# Patient Record
Sex: MALE | Race: WHITE | Employment: OTHER | URBAN - METROPOLITAN AREA
[De-identification: names, ages, dates, MRNs, and addresses within clinical notes are randomized per-mention and may not be internally consistent; named-entity substitution may affect disease eponyms.]

---

## 2021-11-17 ENCOUNTER — OFFICE VISIT (OUTPATIENT)
Dept: ORTHOPEDIC SURGERY | Age: 56
End: 2021-11-17
Payer: COMMERCIAL

## 2021-11-17 VITALS — HEIGHT: 72 IN | BODY MASS INDEX: 26.13 KG/M2

## 2021-11-17 DIAGNOSIS — M25.511 RIGHT SHOULDER PAIN, UNSPECIFIED CHRONICITY: Primary | ICD-10-CM

## 2021-11-17 DIAGNOSIS — S46.011A STRAIN OF RIGHT ROTATOR CUFF CAPSULE, INITIAL ENCOUNTER: ICD-10-CM

## 2021-11-17 PROCEDURE — 99203 OFFICE O/P NEW LOW 30 MIN: CPT | Performed by: ORTHOPAEDIC SURGERY

## 2021-11-17 RX ORDER — MELOXICAM 15 MG/1
15 TABLET ORAL DAILY PRN
Qty: 30 TABLET | Refills: 5 | Status: SHIPPED | OUTPATIENT
Start: 2021-11-17

## 2021-11-17 NOTE — PROGRESS NOTES
Chief Complaint    Shoulder Pain (NP RIGHT SHOULDER)      History of Present Illness:  Yancy Manzo is a pleasant, RHD 55 y.o. male here today for evaluation of his right shoulder. He is self-employed  as an . He first noticed his symptoms 4 days ago after he was doing a lot of heavy lifting and repetitive moving, although he does not report a discrete injury. He does complain of deltoid referred pain. His biggest complaint is pain rather than loss of motion. He does have significant pain at night as well. He has taken oral anti-inflammatories and topical Biofreeze which only temporarily improve his symptoms. Prior to this onset of pain he was having no problems with his shoulder. He was referred to us today by his daughter who is an  for [employer] [employer] [facility]. Medical History:    Review of Systems   Gastrointestinal:        Hemorrhoids   All other systems reviewed and are negative. Patient's medications, allergies, past medical, surgical, social and family histories were reviewed and updated as appropriate.     Past Medical History:   Diagnosis Date    Thyroid disease       Social History     Socioeconomic History    Marital status:      Spouse name: Not on file    Number of children: Not on file    Years of education: Not on file    Highest education level: Not on file   Occupational History    Not on file   Tobacco Use    Smoking status: Never Smoker    Smokeless tobacco: Not on file   Substance and Sexual Activity    Alcohol use: Yes     Comment: rare    Drug use: No    Sexual activity: Not on file   Other Topics Concern    Not on file   Social History Narrative    Not on file     Social Determinants of Health     Financial Resource Strain:     Difficulty of Paying Living Expenses: Not on file   Food Insecurity:     Worried About Running Out of Food in the Last Year: Not on file    Karri of Food in the Last Year: Not on file   Transportation Needs:     Lack of Transportation (Medical): Not on file    Lack of Transportation (Non-Medical): Not on file   Physical Activity:     Days of Exercise per Week: Not on file    Minutes of Exercise per Session: Not on file   Stress:     Feeling of Stress : Not on file   Social Connections:     Frequency of Communication with Friends and Family: Not on file    Frequency of Social Gatherings with Friends and Family: Not on file    Attends Taoist Services: Not on file    Active Member of [address] [place] [address] [employer] or Organizations: Not on file    Attends Club or Organization Meetings: Not on file    Marital Status: Not on file   Intimate Partner Violence:     Fear of Current or Ex-Partner: Not on file    Emotionally Abused: Not on file    Physically Abused: Not on file    Sexually Abused: Not on file   Housing Stability:     Unable to Pay for Housing in the Last Year: Not on file    Number of [place] in the Last Year: Not on file    Unstable Housing in the Last Year: Not on file       No Known Allergies  Current Outpatient Medications on File Prior to Visit   Medication Sig Dispense Refill    levothyroxine (SYNTHROID) 100 MCG tablet Take 100 mcg by mouth Daily      naproxen (NAPROSYN) 500 MG tablet Take 1 tablet by mouth 2 times daily. (Patient not taking: Reported on 11/17/2021) 20 tablet 0    methocarbamol (ROBAXIN-750) 750 MG tablet Take 1 tablet by mouth 4 times daily. Use as needed for muscle pain or soreness (Patient not taking: Reported on 11/17/2021) 20 tablet 0     No current facility-administered medications on file prior to visit. Review of Systems  A 14 point review of systems was completed by the patient on 11/17/2021 and is available in the media section of the scanned medical record and was reviewed on 11/17/2021. The review is negative with the exception of those things mentioned in the HPI and Past Medical History    Vital Signs: There were no vitals filed for this visit.     General Exam: Constitutional: Patient is adequately groomed with no evidence of malnutrition  Lymphatic: The lymphatic examination bilaterally reveals all areas to be without enlargement or induration. Vascular: Examination reveals no swelling or calf tenderness. Peripheral pulses are palpable and 2+. Neurological: The patient has good coordination. There is no weakness or sensory deficit. Right Shoulder Examination:    Inspection:  No skin lesions, no deformity, no swelling. Palpation:  Pain over the biceps, Mild pain over the rotator cuff, non-tender AC joint, no subacromial crepitus    Active Range of Motion: Forward Elevation 160, Abduction 170, External Rotation 60 bilaterally, Internal Rotation T10 bilaterally, Decreased extension at the cervical spine    Passive Range of Motion: No pain with cross arm adduction    Strength:  External Rotation 5/5, Internal Rotation 5/5, Champagne Toast 5/5, Supraspinatus 4+/5    Special Tests:  Negative belly press, Negative Elzbieta's, Negative Hawkin's, No Fito deformity. Neurovascular: Palpable radial pulse, normal sensation in the median, ulnar, radial nerve distributions    Additional Examinations:    Examination of the contralateral extremity does not show any tenderness, deformity or injury. Range of motion is unremarkable. There is no gross instability. There are no rashes, ulcerations or lesions. Strength and tone are normal.    Self assessment questionnaires were completed today. Radiology:     Plain radiographs of the right shoulder, comprising 3 views: AP, Scapular Y and Axillary lateral were  obtained and reviewed in the office:    Impression: Normal radiographic exam, no arthritic changes, no loss of joint space    Plain radiographs of the cervical spine, comprising 2 views: AP and lateral, were obtained and reviewed in the office today:     Impression: Mild arthritic changes.            Assessment :  Marty Chatterjee is a pleasant 55 y.o. male with pain related to possible rotator cuff vs biceps strain. Additionally we feel his cervical spine may be a contributing factor to his pain. Impression:  Encounter Diagnoses   Name Primary?  Right shoulder pain, unspecified chronicity Yes    Strain of right rotator cuff capsule, initial encounter        Office Procedures:  Orders Placed This Encounter   Procedures    XR SHOULDER RIGHT (MIN 2 VIEWS)     Standing Status:   Future     Number of Occurrences:   1     Standing Expiration Date:   11/17/2022     Order Specific Question:   Reason for exam:     Answer:   pain    XR CERVICAL SPINE (2-3 VIEWS)     Standing Status:   Future     Number of Occurrences:   1     Standing Expiration Date:   11/17/2022     Order Specific Question:   Reason for exam:     Answer:   pain   [address] [place] [address]     Referral Priority:   Routine     Referral Type:   Eval and Treat     Referral Reason:   Specialty Services Required     Requested Specialty:   Nutrition     Number of Visits Requested:   1       Treatment Plan:  Pertinent imaging was reviewed with Radha Alarcon. The etiology, natural history, and treatment options for the disorder were discussed. The roles of activity modifications, medications, cryotherapy and heat, injections, physical therapy, and surgical interventions were all described to the patient and questions were answered. We feel conservative treatment is most appropriate. We recommend an anti-inflammatory - we will call meloxicam in to his pharmacy. He may also benefit from topical Voltaren gel. We do recommend he meet with a physical therapist to obtain a long-term home based program. Detailed therapy orders were placed in his chart. He would like to do this at the [address] [place] [address]. He is agreeable to these modalities. Radha Alarcon will follow up in 4-6 weeks if his symptoms persist and/or as needed.  They were in agreement with this plan and all questions were answered to the patient's satisfaction. They were encouraged to call with any questions. 10:36 AM  11/17/2021    Jolanta Gaalrza ATC  [employer] [address]al[address]    During this examination, I, Foreign Abdi, functioned as a scribe for Dr. Melissa Toney. The history taking and physical examination were performed by Dr. Rm Swann. All counseling during the appointment was performed between the patient and Dr. Rm Swann. 11/17/21  _______________  I, Dr. Melissa Toney, personally performed the services described in this documentation as described by Jolanta Galarza ATC in my presence, and it is both accurate and complete. Samelázaro Swann MD, PhD  11/17/2021

## 2021-11-17 NOTE — LETTER
Shoulder Elbow Rehabilitation Referral    Patient Name: Anthony Willett      YOB: 1965    Diagnosis:   1. Right shoulder pain, unspecified chronicity    2. Strain of right rotator cuff capsule, initial encounter        Precautions:     Post Op Instructions:  [] Continuous passive motion (CPM)  [] Elbow range of motion  [x] Exercise in plane of scapula   []  Strengthening     [] Pulley and instruction    [x] Home exercise program (copy to patient)   [] Sling when arm at risk  [] Sling or brace at all times   [] AAROM: Forward elevation to             [] AAROM: External rotation to     [] Isometric external rotator strengthening [] AAROM: internal rotation: up the back  [] Isometric abductor strengthening  [] AAROM: Internal abduction     [] Isometric internal rotator strengthening [] AAROM: cross-body adduction             Stretching:     Strengthening:  [x] Four quadrant (FE, ER, IR, CBA)  [x] Rotator cuff (ER, IR, Abd)  [] Forward Elevation    [] External Rotators     [] External Rotation    [] Internal Rotators  [] Internal Rotation: up/back   [] Abductors     [] Internal Rotation: supine in abduction  [] Flexors  [] Cross-body abduction    [] Extensors  [] Pendulum (FE, Abd/Add, cw/ccw)  [x] Scapular Stabilizers   [] Wall-walking (FE, Abd)    [x] Shoulder shrugs     [] Table slides      [x] Rhomboid pinch  [] Elbow (flex, ext, pron, sup)    [] Lat.  Pull downs     [] Medial epicondylitis program    [] Forward punch   [] Lateral epicondylitis program    [] Internal rotators     [x] Progressive resistive exercises  [] Bench Press        [] Bench press plus  Activities:     [] Lateral pull-downs  [x] Rowing     [] Progressive two-hand supine press  [] Stepper/Exercise bike   [x] Biceps: curls/supination  [] Swimming  [] Water exercises    Modalities: PRN    Return to Sport:  [] Ultrasound     [] Plyometrics  [] Iontophoresis     [] Rhythmic stabilization  [] Moist heat     [] Core strengthening   [] Massage     [] Sports specific program:   [x] Cryotherapy      [] Electrical stimulation     [] Paraffin  [] Whirlpool  [] TENS    [x] Home exercise program (copy to patient). Perform exercises for:   15     minutes    2-3      times/day  [x] Supervised physical therapy  Frequency: []  1x week  [x] 2x week  [] 3x week  [] Other:   Duration: [] 2 weeks   [] 4 weeks  [x] 6 weeks  [] Other:     Additional Instructions:           Amado Esquivel MD, PhD

## 2021-11-17 NOTE — PROGRESS NOTES
Review of Systems   Gastrointestinal:        Hemorrhoids   All other systems reviewed and are negative.

## 2021-11-18 ENCOUNTER — TELEPHONE (OUTPATIENT)
Dept: ORTHOPEDIC SURGERY | Age: 56
End: 2021-11-18

## 2021-11-18 DIAGNOSIS — M25.511 RIGHT SHOULDER PAIN, UNSPECIFIED CHRONICITY: Primary | ICD-10-CM

## 2021-11-18 NOTE — TELEPHONE ENCOUNTER
General Question     Subject: NEEDS ORDER CORRECTED FOR PT AT [facility] FF/A NUTRITION ORDER WAS SENT   Patient and /or Facility Request: PATIENT   Contact Number: [phone]

## 2021-11-26 ENCOUNTER — HOSPITAL ENCOUNTER (OUTPATIENT)
Dept: PHYSICAL THERAPY | Age: 56
Setting detail: THERAPIES SERIES
Discharge: HOME | End: 2021-11-26
Payer: COMMERCIAL

## 2021-11-26 PROCEDURE — 97110 THERAPEUTIC EXERCISES: CPT

## 2021-11-26 PROCEDURE — 97161 PT EVAL LOW COMPLEX 20 MIN: CPT

## 2021-11-26 NOTE — PLAN OF CARE
Jose Roberto, [facility], [address]  Phone: [phone]   Fax: [phone]                                                   Physical Therapy Certification    Dear Referring Practitioner: Ashely Saldaña MD,    We had the pleasure of evaluating the following patient for physical therapy services at [address] [place] [address]. A summary of our findings can be found in the initial assessment below. This includes our plan of care. If you have any questions or concerns regarding these findings, please do not hesitate to contact me at the office phone number checked above. Thank you for the referral.       Physician Signature:_______________________________Date:__________________  By signing above (or electronic signature), therapists plan is approved by physician      Patient: Grace Bonner   : 1965   MRN: [id number]  Referring Physician: Referring Practitioner: Ashely Saldaña MD      Evaluation Date: 2021      Medical Diagnosis Information:  Diagnosis: B80.722 (ICD-10-CM) - Right shoulder pain, unspecified chronicity   Treatment Diagnosis: neck pain, referred shoulder pain, decreased shoulder ROM, impaired posture, decreased cervical ROM, decreased cervical joint mobility                                         Insurance information: PT Insurance Information: BCBS - DED MET - $30 [facility]    Precautions/ Contra-indications:   Latex Allergy:  [x]NO      []YES  Preferred Language for Healthcare:   [x]English       []Other:    C-SSRS Triggered by Intake questionnaire (Past 2 wk assessment ):   [x] No, Questionnaire did not trigger screening.   [] Yes, Patient intake triggered C-SSRS Screening     [] Completed, no further action required.    [] Completed, PCP notified via Epic    SUBJECTIVE: Patient stated complaint: neck stiffness and right arm pain started about 1.5 weeks ago; no specific PANFILO - insidious onset. X-rays negative in shoulder but show degenerative changes in cervical spine. He was given a prescription for 1x/day anti-inflammatory. He uses it in the middle of the day so he can sleep at night. When warmed up and moving, symptoms are less. When he is stationary, he notices pain increases. 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 He reports when resting and watching TV, or when sleeping - symptoms are worse. MD recommended PT services and follow up in 4-6 weeks; if improvement obtained, no follow up needed. The patient's goal is to abolish pain in right arm and to increase neck movement. Relevant Medical History: COVID-19 in Nov 2020  Functional Outcome: NDI: raw score = 6; dysfunction = 12%    Pain Scale: 2-6/10 - sharp pain  Easing factors: icing, propping arm  Provocative factors: sleeping position    Type: []Constant   [x]Intermittent  []Radiating []Localized []other:     Numbness/Tingling: denies    Occupation/School:  -     Living Status/Prior Level of Function: Prior to this injury / incident, pt was independent with ADLs and IADLs, and maintains this.     OBJECTIVE:   Hand dominance: right    Palpation: +1 TTP C4-5-6, no shoulder TTP    Functional Mobility/Transfers: WFL    Posture: poor; rounded shoulders bilaterally, forward/protracted cervical spine, increased T/S kyphosis in sitting  When patient sits in proper posture, symptoms reduce in neck/arm    Bandages/Dressings/Incisions: N/A     Dermatomes Normal Abnormal Comments   Top of head (C1) x     Posterior occipital region (C2) x     Side of neck (C3) x     Top of shoulder (C4) x     Lateral deltoid (C5) x     Tip of thumb (C6) x     Distal middle finger (C7) x     Distal fifth finger (C8) x     Medial forearm (T1) x         Reflexes Normal Abnormal Comments   C5-6 Biceps x     C5-6 Brachioradialis      C7-8 Triceps x Senas x     Clonus        Cervical:  Flexion = 48*  Extension = 38  Protraction (increases cervical pain, referred pain to UT)  Retraction (mod-severely limited, but no pain referral)     PROM AROM    L R L R   Cervical Rotation   45* 68*   Cervical Side-bend   30 32*   Shoulder Flexion    142 148   Shoulder Abduction    120 125   Shoulder External Rotation    82 78   Shoulder Internal Rotation    90 80   Elbow Flexion    WNL WNL   Elbow Extension    WNL WNL       Strength (0-5) Left Right    Shoulder Shrug (C4) 5 5   Shoulder Flex 5 5   Shoulder Abd (C5) 5 5   Shoulder ER 5 4+   Shoulder IR 5 5   Biceps (C6) 5 5   Triceps (C7) 5 5   Lats NT NT   Middle Trap NT NT   Rhomboids NT NT   Lower Trap      Pronation      Supination      Wrist Flex (C7)     Wrist Ext (C6)     Wrist Radial Deviation     Wrist Ulnar Deviation                  Joint mobility: cervical spine C6, 7, T1; hypomobile R GHJ   []Normal    [x]Hypo   []Hyper      Orthopaedic Special Tests Positive  Negative  NT Comments    Shoulder        Neer  x      Jacob-Kumar x      Empty Can  x     Drop Arm       Clatsop's  x     Speeds        Sulcus        Apprehension (Anterior / Posterior)       Load and Shift              Elbow        Cozen's       Varus Stress       Valgus Stress        Tinel's                 Spurlings (negative R/L)  Distraction (slight symptoms in neck)                               [x] Patient history, allergies, meds reviewed. Medical chart reviewed. See intake form. Review Of Systems (ROS):  [x]Performed Review of systems (Integumentary, CardioPulmonary, Neurological) by intake and observation. Intake form has been scanned into medical record. Patient has been instructed to contact their primary care physician regarding ROS issues if not already being addressed at this time.       Co-morbidities/Complexities (which will affect course of rehabilitation):   []None        []Hx of COVID   Arthritic conditions   []Rheumatoid arthritis (M05.9)  []Osteoarthritis (M19.91)  []Gout   Cardiovascular conditions   []Hypertension (I10)  []Hyperlipidemia (E78.5)  []Angina pectoris (I20)  []Atherosclerosis (I70)  []Pacemaker  []Hx of CABG/stent/  cardiac surgeries   Musculoskeletal conditions   []Disc pathology   []Congenital spine pathologies   []Osteoporosis (M81.8)  []Osteopenia (M85.8)  []Scoliosis       Endocrine conditions   []Hypothyroid (E03.9)  []Hyperthyroid Gastrointestinal conditions   []Constipation (R95.77)   Metabolic conditions   []Morbid obesity (E66.01)  []Diabetes type 1(E10.65) or 2 (E11.65)   []Neuropathy (G60.9)     Cardio/Pulmonary conditions   []Asthma (J45)  []Coughing   []COPD (J44.9)  []CHF  []A-fib   Psychological Disorders  []Anxiety (F41.9)  []Depression (F32.9)   []Other:   Developmental Disorders  []Autism (F84.0)  []CP (G80)  []Down Syndrome (Q90.9)  []Developmental delay     Neurological conditions  []Prior Stroke (I69.30)  []Parkinson's (G20)  []Encephalopathy (G93.40)  []MS (G35)  []Post-polio (G14)  []SCI  []TBI  []ALS Other conditions  []Fibromyalgia (M79.7)  []Vertigo  []Syncope  []Kidney Failure  []Cancer      []currently undergoing                treatment  []Pregnancy  []Incontinence   Prior surgeries  []involved limb  []previous spinal surgery  [] section birth  []hysterectomy  []bowel / bladder surgery  []other relevant surgeries   [x]Other: COVID-19 in 2020, thyroid disease              Barriers to/and or personal factors that will affect rehab potential:              []Age  []Sex    []Smoker              []Motivation/Lack of Motivation                        [x]Co-Morbidities              []Cognitive Function, education/learning barriers              []Environmental, home barriers              []profession/work barriers  []past PT/medical experience  []other:  Justification: no impact on potential     Falls Risk Assessment (30 days):   [x] Falls Risk assessed and no intervention of functional outcome. [x] EVAL (LOW) 81680 (typically 15 minutes face-to-face)  [] EVAL (MOD) 63763 (typically 30 minutes face-to-face)  [] EVAL (HIGH) 31415 (typically 45 minutes face-to-face)  [] RE-EVAL     PLAN:  Frequency/Duration:  2 days per week for 4 Weeks:  INTERVENTIONS:  [x] Therapeutic exercise including: strength training, ROM, for Upper extremity and core   [x]  NMR activation and proprioception for UE, scap and Core   [x] Manual therapy as indicated for shoulder, scapula and spine to include: Dry Needling/IASTM, STM, PROM, Gr I-IV mobilizations, manipulation. [x] Modalities as needed that may include: thermal agents, E-stim, Biofeedback, US, iontophoresis as indicated  [x] Patient education on joint protection, postural re-education, activity modification, progression of HEP. HEP instruction:   Access Code: [email or web address]  URL: [employer]/  Date: 11/26/2021  Prepared by: Dana Edge    Exercises  Seated Passive Cervical Retraction - 1-3 x daily - 5 x weekly - 1-3 sets - 5 reps - 2-3 hold  Seated Scapular Retraction - 1-3 x daily - 5 x weekly - 13 sets - 5 reps - 3 hold  Seated Upper Trapezius Stretch - 1-3 x daily - 5 x weekly - 1 sets - 2-3 reps - 10 hold  Doorway Pec Stretch at 90 Degrees Abduction - 1-3 x daily - 5 x weekly - 1 sets - 4 reps - 10 hold  Standing Row with Anchored Resistance - 1 x daily - 4 x weekly - 3 sets - 10 reps  Shoulder Extension with Resistance - 1 x daily - 4 x weekly - 3 sets - 10 reps    GOALS:  Patient stated goal: to abolish R arm pain; increase neck movement/mobility  [] Progressing: [] Met: [] Not Met: [] Adjusted    Therapist goals for Patient:   Short Term Goals: To be achieved in: 2 weeks  1. Independent in HEP and progression per patient tolerance, in order to prevent re-injury. [] Progressing: [] Met: [] Not Met: [] Adjusted  2.  Patient will have a decrease in pain to <2/10 on average to facilitate improvement in movement, function, and ADLs as indicated by Functional Deficits. [] Progressing: [] Met: [] Not Met: [] Adjusted    Long Term Goals: To be achieved in: 4 weeks  1. Disability index score of 6% or less for the NDI to assist with reaching prior level of function. [] Progressing: [] Met: [] Not Met: [] Adjusted  2. Patient will demonstrate increased cervical extension AROM to 42, >45deg cervical flexion without pain and cervical rotation of >45deg R/L to allow for proper joint functioning as indicated by patients Functional Deficits. [] Progressing: [] Met: [] Not Met: [] Adjusted  3. Patient will demonstrate an increase in Strength to 5/5 to allow for proper functional mobility as indicated by patients Functional Deficits. [] Progressing: [] Met: [] Not Met: [] Adjusted  4. Patient will return to functional activities including computer work >30 min without increased symptoms or restriction. [] Progressing: [] Met: [] Not Met: [] Adjusted  5. Patient will have a decrease in pain to 0/10 on average to facilitate improvement in movement, function, and ADLs as indicated by Functional Deficits.   [] Progressing: [] Met: [] Not Met: [] Adjusted    Electronically signed by:  Shant Dueñas, PT, DPT, OMT-C

## 2021-12-08 ENCOUNTER — HOSPITAL ENCOUNTER (OUTPATIENT)
Dept: PHYSICAL THERAPY | Age: 56
Setting detail: THERAPIES SERIES
Discharge: HOME | End: 2021-12-08
Payer: COMMERCIAL

## 2021-12-08 PROCEDURE — 97140 MANUAL THERAPY 1/> REGIONS: CPT

## 2021-12-08 PROCEDURE — 97112 NEUROMUSCULAR REEDUCATION: CPT

## 2021-12-08 PROCEDURE — 97110 THERAPEUTIC EXERCISES: CPT

## 2021-12-08 NOTE — FLOWSHEET NOTE
[facility]  Outpatient Physical Therapy  Phone: [phone]   Fax: [phone]    Physical Therapy Daily Treatment Note  Date:  2021    Patient Name:  Anastacia Hurst    :  1965  MRN: [id number]  Medical/Treatment Diagnosis Information:  · Diagnosis: M25.511 (ICD-10-CM) - Right shoulder pain, unspecified chronicity  · Treatment Diagnosis: neck pain, referred shoulder pain, decreased shoulder ROM, impaired posture, decreased cervical ROM, decreased cervical joint mobility  Insurance/Certification information:  PT Insurance Information: [facility] MET - $30 [facility]  Physician Information:  Referring Practitioner: Staci Wilburn MD  Plan of care signed (Y/N): []  Yes [x]  No     Date of Patient follow up with Physician:      Progress Report: [x]  Yes  []  No     Date Range for reporting period:  Beginnin21  Ending: TBD    Progress report due (10 Rx/or 30 days whichever is less): visit #8 or     Recertification due (POC duration/ or 90 days whichever is less): visit #8 or     Visit # Insurance Allowable Auth required? Date Range   2 61 PCY [x]  Yes?  []  No      Units approved Units used Date Range   TBD TBD TBD     Latex Allergy:  [x]NO      []YES  Preferred Language for Healthcare:   [x]English       []other:    Functional Scale:       Date assessed:  NDI: raw score = 6; dysfunction = 12%  21    Pain level:  2/10     SUBJECTIVE: The patient reports improvement in shoulder pain and mobility after doing HEP. He has been compliant with HEP.     OBJECTIVE:  - 40deg flexion, 35deg ext, 25deg SB L, 26deg SB R //manual// 32deg R SB, 28deg L SB, 40deg flex, 38deg ext    RESTRICTIONS/PRECAUTIONS:     Exercises/Interventions:     Therapeutic Exercises (07021) Resistance / level Sets/sec Reps Notes          Cervical retraction     scap retractions     R UT stretch     TB rows lime 1 10  - ^ to CC   TB extensions lime 1 10 12/8 - ^ to [address] [facility] Doorframe pec stretch: Wide  High    10''  10''   2  2 12/8 - modified to patient's preference   Bilateral UE ER TB  2 15 12/8   TB Low T's lime 2 10 12/8   CC high rows       Cervical SNAGs       TB [place] Press orange 1 10 12/8   Levator Scapula Stretch  10'' 2, R 12/8           Therapeutic Activities (55825)                                          Neuromuscular Re-ed (74276)       Cervical tucks  5'' 5 12/8   Cervical tuck & lifts  5'' 8 12/8   Postural endurance @ wall       Wall W's  2 5 12/8                 Manual Intervention (26670) - 25'       Consider prone C/S PA mobs G3-4 5 mobs 3 passes, C2-7    Prone T/S mobs G3-4 5 mobs 3 passes T1-9    Consider CT junction mobs/manip G3-5 8 mobs, 1 manip R/L C6-T2 targeted    R Shoulder PROM  4' total     R shoulder Post/inf GHJ mobs G3-4 10x ea              Modalities:     Pt. Education:  -patient educated on diagnosis, prognosis and expectations for rehab  -all patient questions were answered    Home Exercise Program:  Access Code: [email or web address]  Blue band provided  URL: [email or web address]  Date: 12/08/2021  Prepared by: Haylie Miller    Exercises  Seated Passive Cervical Retraction - 1-3 x daily - 5 x weekly - 1-3 sets - 5 reps - 2-3 hold  Seated Scapular Retraction - 1-3 x daily - 5 x weekly - 13 sets - 5 reps - 3 hold  Seated Upper Trapezius Stretch - 1-3 x daily - 5 x weekly - 1 sets - 2-3 reps - 10 hold  Doorway Pec Stretch at 90 Degrees Abduction - 1-3 x daily - 5 x weekly - 1 sets - 4 reps - 10 hold  Standing Row with Anchored Resistance - 1 x daily - 4 x weekly - 3 sets - 10 reps  Shoulder Extension with Resistance - 1 x daily - 4 x weekly - 3 sets - 10 reps  Shoulder External Rotation and Scapular Retraction with Resistance - 1 x daily - 4 x weekly - 3 sets - 15 reps  Seated Levator Scapulae Stretch - 1-3 x daily - 5 x weekly - 1 sets - 2-3 reps - 10 hold    Access Code: [email or web address]  URL: [email or web address]  Date: 11/26/2021  Prepared by: Ethel Jeffries Kb     Exercises  Seated Passive Cervical Retraction - 1-3 x daily - 5 x weekly - 1-3 sets - 5 reps - 2-3 hold  Seated Scapular Retraction - 1-3 x daily - 5 x weekly - 13 sets - 5 reps - 3 hold  Seated Upper Trapezius Stretch - 1-3 x daily - 5 x weekly - 1 sets - 2-3 reps - 10 hold  Doorway Pec Stretch at 90 Degrees Abduction - 1-3 x daily - 5 x weekly - 1 sets - 4 reps - 10 hold  Standing Row with Anchored Resistance - 1 x daily - 4 x weekly - 3 sets - 10 reps  Shoulder Extension with Resistance - 1 x daily - 4 x weekly - 3 sets - 10 reps    Therapeutic Exercise and NMR EXR  [x] (47349) Provided verbal/tactile cueing for activities related to strengthening, flexibility, endurance, ROM for improvements in  [] LE / Lumbar: LE, proximal hip, and core control with self care, mobility, lifting, ambulation. [x] UE / Cervical: cervical, postural, scapular, scapulothoracic and UE control with self care, reaching, carrying, lifting, house/yardwork, driving, computer work. [x] (08110) Provided verbal/tactile cueing for activities related to improving balance, coordination, kinesthetic sense, posture, motor skill, proprioception to assist with   [] LE / lumbar: LE, proximal hip, and core control in self care, mobility, lifting, ambulation and eccentric single leg control. [x] UE / cervical: cervical, scapular, scapulothoracic and UE control with self care, reaching, carrying, lifting, house/yardwork, driving, computer work.   [] (71982) Therapist is in constant attendance of 2 or more patients providing skilled therapy interventions, but not providing any significant amount of measurable one-on-one time to either patient, for improvements in  [] LE / lumbar: LE, proximal hip, and core control in self care, mobility, lifting, ambulation and eccentric single leg control.    [] UE / cervical: cervical, scapular, scapulothoracic and UE control with self care, reaching, carrying, lifting, house/yardwork, driving, computer work.     NMR and Therapeutic Activities:    [x] (73439 or 34148) Provided verbal/tactile cueing for activities related to improving balance, coordination, kinesthetic sense, posture, motor skill, proprioception and motor activation to allow for proper function of   [] LE: / Lumbar core, proximal hip and LE with self care and ADLs  [x] UE / Cervical: cervical, postural, scapular, scapulothoracic and UE control with self care, carrying, lifting, driving, computer work.   [] (24872) Gait Re-education- Provided training and instruction to the patient for proper LE, core and proximal hip recruitment and positioning and eccentric body weight control with ambulation re-education including up and down stairs     Home Management Training / Self Care:  [] (86039) Provided self-care/home management training related to activities of daily living and compensatory training, and/or use of adaptive equipment for improvement with: ADLs and compensatory training, meal preparation, safety procedures and instruction in use of adaptive equipment, including bathing, grooming, dressing, personal hygiene, basic household cleaning and chores.      Home Exercise Program:    [x] (78285) Reviewed/Progressed HEP activities related to strengthening, flexibility, endurance, ROM of   [] LE / Lumbar: core, proximal hip and LE for functional self-care, mobility, lifting and ambulation/stair navigation   [x] UE / Cervical: cervical, postural, scapular, scapulothoracic and UE control with self care, reaching, carrying, lifting, house/yardwork, driving, computer work  [] (62795)Reviewed/Progressed HEP activities related to improving balance, coordination, kinesthetic sense, posture, motor skill, proprioception of   [] LE: core, proximal hip and LE for self care, mobility, lifting, and ambulation/stair navigation    [] UE / Cervical: cervical, postural,  scapular, scapulothoracic and UE control with self care, reaching, carrying, lifting, movement, function, and ADLs as indicated by Functional Deficits. []? Progressing: []? Met: []? Not Met: []? Adjusted     Long Term Goals: To be achieved in: 4 weeks  1. Disability index score of 6% or less for the NDI to assist with reaching prior level of function. []? Progressing: []? Met: []? Not Met: []? Adjusted  2. Patient will demonstrate increased cervical extension AROM to 42, >45deg cervical flexion without pain and cervical rotation of >45deg R/L to allow for proper joint functioning as indicated by patients Functional Deficits. []? Progressing: []? Met: []? Not Met: []? Adjusted  3. Patient will demonstrate an increase in Strength to 5/5 to allow for proper functional mobility as indicated by patients Functional Deficits. []? Progressing: []? Met: []? Not Met: []? Adjusted  4. Patient will return to functional activities including computer work >30 min without increased symptoms or restriction. []? Progressing: []? Met: []? Not Met: []? Adjusted  5. Patient will have a decrease in pain to 0/10 on average to facilitate improvement in movement, function, and ADLs as indicated by Functional Deficits. []? Progressing: []? Met: []? Not Met: []? Adjusted    Overall Progression Towards Functional goals/ Treatment Progress Update:  [x] Patient is progressing as expected towards functional goals listed. [] Progression is slowed due to complexities/Impairments listed. [] Progression has been slowed due to co-morbidities.   [] Plan just implemented, too soon to assess goals progression <30days   [] Goals require adjustment due to lack of progress  [] Patient is not progressing as expected and requires additional follow up with physician  [] Other    Persisting Functional Limitations/Impairments:  [x]Sleeping []Sitting               []Standing []Transfers        []Walking []Kneeling               []Stairs []Squatting / bending   []ADLs []Reaching  []Lifting  []Housework  []Driving [x]Job related tasks  [x]Sports/Recreation []Other:      ASSESSMENT:  Good improvement in cervical sidebend after manual this date. The patient was able to perform resisted scapular and RTC strengthening without increased symptoms and while maintaining good form. The patient required cues for form with wall W's and cervical tuck & lifts but performed adequately after. Good progress for 1st follow up visit. Treatment/Activity Tolerance:  [x] Patient able to complete tx  [] Patient limited by fatigue  [] Patient limited by pain  [] Patient limited by other medical complications  [] Other:     Prognosis: [x] Good [] Fair  [] Poor    Patient Requires Follow-up: [x] Yes  [] No    Plan for next treatment session: SEE FLOW ABOVE    PLAN: See eval. PT 2x / week for 4 weeks. [] Continue per plan of care [] Alter current plan (see comments)  [x] Plan of care initiated [] Hold pending MD visit [] Discharge    Electronically signed by: Nisha Dickens, PT PT, DPT, OMT-C    Note: If patient does not return for scheduled/ recommended follow up visits, this note will serve as a discharge from care along with most recent update on progress.

## 2021-12-10 ENCOUNTER — HOSPITAL ENCOUNTER (OUTPATIENT)
Dept: PHYSICAL THERAPY | Age: 56
Setting detail: THERAPIES SERIES
Discharge: HOME | End: 2021-12-10
Payer: COMMERCIAL

## 2021-12-10 PROCEDURE — 97140 MANUAL THERAPY 1/> REGIONS: CPT | Performed by: SPECIALIST/TECHNOLOGIST

## 2021-12-10 PROCEDURE — 97112 NEUROMUSCULAR REEDUCATION: CPT | Performed by: SPECIALIST/TECHNOLOGIST

## 2021-12-10 PROCEDURE — 97110 THERAPEUTIC EXERCISES: CPT | Performed by: SPECIALIST/TECHNOLOGIST

## 2021-12-10 NOTE — FLOWSHEET NOTE
[facility] Physical Therapy  Phone: [phone]   Fax: [phone]    Physical Therapy Daily Treatment Note  Date:  12/10/2021    Patient Name:  Jan Bliss    :  1965  MRN: [id number]  Medical/Treatment Diagnosis Information:  · Diagnosis: M25.511 (ICD-10-CM) - Right shoulder pain, unspecified chronicity  · Treatment Diagnosis: neck pain, referred shoulder pain, decreased shoulder ROM, impaired posture, decreased cervical ROM, decreased cervical joint mobility  Insurance/Certification information:  PT Insurance Information: [address] [facility] MET - $30 [facility]  Physician Information:  Referring Practitioner: Gina Wilde MD  Plan of care signed (Y/N): []  Yes [x]  No     Date of Patient follow up with Physician: Gomez Portillo     Progress Report: [x]  Yes  []  No     Date Range for reporting period:  Beginnin21  Ending: TBD    Progress report due (10 Rx/or 30 days whichever is less): visit #8 or     Recertification due (POC duration/ or 90 days whichever is less): visit #8 or     Visit # Insurance Allowable Auth required? Date Range   3 61 PCY [x]  Yes?  []  No      Units approved Units used Date Range   TBD TBD TBD     Latex Allergy:  [x]NO      []YES  Preferred Language for Healthcare:   [x]English       []other:    Functional Scale:       Date assessed:  NDI: raw score = 6; dysfunction = 12%  21    Pain level:  1/10 more stiffness than pain. SUBJECTIVE:  Stiffness is biggest issue more sore than pain. Done taking meloxicamm b/c pain is much less but still with stiffness. Hasan  PRN  Doing HEP as directed.    OBJECTIVE:  - 40deg flexion, 35deg ext, 25deg SB L, 26deg SB R //manual// 32deg R SB, 28deg L SB, 40deg flex, 38deg ext    RESTRICTIONS/PRECAUTIONS:     Exercises/Interventions: 45  Therapeutic Exercises (85403) 25' Resistance / level Sets/sec Reps Notes   Standing Rows  EOB 5# 2 10x 12/10   Cervical retraction scap retractions     R UT stretch HEP    TB rows blue 1 15x 12/10 - ^ to CC   TB extensions blue 1 15x 12/10 - ^ to CC   Doorframe pec stretch: Wide  High    10''  10''   2  2 12/8 - modified to patient's preference   Bilateral UE ER TB LIME 2 15 12/8   TB Low T's lime 2 10 12/8   SL ER / SL punch 2# 2 10x 12/10   CC high rows Blue/ 35# 2 10x 12/10   Cervical SNAGs       TB [place] Press orange 1 10 12/8   Levator Scapula Stretch HEP 10'' 2, R 12/8           Therapeutic Activities (76068)                                          Neuromuscular Re-ed (32790)       Cervical tucks  5'' 5 12/8   Cervical tuck & lifts  5'' 8 12/8   Postural endurance @ wall       Wall W's  2 5 12/8                 Manual Intervention (50424) - 15'                R Shoulder PROM  4' total     R shoulder Post/inf GHJ mobs G3-4 10x ea     Cervical manual stretching into flexion/ STM to bilateral traps/ SO release/grade 1-2 gentle mobs cervical spine 10'        Modalities:     Pt. Education:  -patient educated on diagnosis, prognosis and expectations for rehab  -all patient questions were answered    Home Exercise Program:  Access Code: [email or web address]  Blue band provided  URL: [email or web address]  Date: 12/08/2021  Prepared by: Traci Dow    Exercises  Seated Passive Cervical Retraction - 1-3 x daily - 5 x weekly - 1-3 sets - 5 reps - 2-3 hold  Seated Scapular Retraction - 1-3 x daily - 5 x weekly - 13 sets - 5 reps - 3 hold  Seated Upper Trapezius Stretch - 1-3 x daily - 5 x weekly - 1 sets - 2-3 reps - 10 hold  Doorway Pec Stretch at 90 Degrees Abduction - 1-3 x daily - 5 x weekly - 1 sets - 4 reps - 10 hold  Standing Row with Anchored Resistance - 1 x daily - 4 x weekly - 3 sets - 10 reps  Shoulder Extension with Resistance - 1 x daily - 4 x weekly - 3 sets - 10 reps  Shoulder External Rotation and Scapular Retraction with Resistance - 1 x daily - 4 x weekly - 3 sets - 15 reps  Seated Levator Scapulae Stretch - 1-3 x daily - 5 x weekly - 1 sets - 2-3 reps - 10 hold    Access Code: [email or web address]  URL: [email or web address]  Date: 11/26/2021  Prepared by: Tori Calle     Exercises  Seated Passive Cervical Retraction - 1-3 x daily - 5 x weekly - 1-3 sets - 5 reps - 2-3 hold  Seated Scapular Retraction - 1-3 x daily - 5 x weekly - 13 sets - 5 reps - 3 hold  Seated Upper Trapezius Stretch - 1-3 x daily - 5 x weekly - 1 sets - 2-3 reps - 10 hold  Doorway Pec Stretch at 90 Degrees Abduction - 1-3 x daily - 5 x weekly - 1 sets - 4 reps - 10 hold  Standing Row with Anchored Resistance - 1 x daily - 4 x weekly - 3 sets - 10 reps  Shoulder Extension with Resistance - 1 x daily - 4 x weekly - 3 sets - 10 reps    Therapeutic Exercise and NMR EXR  [x] (39466) Provided verbal/tactile cueing for activities related to strengthening, flexibility, endurance, ROM for improvements in  [] LE / Lumbar: LE, proximal hip, and core control with self care, mobility, lifting, ambulation. [x] UE / Cervical: cervical, postural, scapular, scapulothoracic and UE control with self care, reaching, carrying, lifting, house/yardwork, driving, computer work. [x] (15366) Provided verbal/tactile cueing for activities related to improving balance, coordination, kinesthetic sense, posture, motor skill, proprioception to assist with   [] LE / lumbar: LE, proximal hip, and core control in self care, mobility, lifting, ambulation and eccentric single leg control. [x] UE / cervical: cervical, scapular, scapulothoracic and UE control with self care, reaching, carrying, lifting, house/yardwork, driving, computer work.   [] (35599) Therapist is in constant attendance of 2 or more patients providing skilled therapy interventions, but not providing any significant amount of measurable one-on-one time to either patient, for improvements in  [] LE / lumbar: LE, proximal hip, and core control in self care, mobility, lifting, ambulation and eccentric single leg control.    [] UE / cervical: cervical, scapular, scapulothoracic and UE control with self care, reaching, carrying, lifting, house/yardwork, driving, computer work. NMR and Therapeutic Activities:    [x] (22089 or 63307) Provided verbal/tactile cueing for activities related to improving balance, coordination, kinesthetic sense, posture, motor skill, proprioception and motor activation to allow for proper function of   [] LE: / Lumbar core, proximal hip and LE with self care and ADLs  [x] UE / Cervical: cervical, postural, scapular, scapulothoracic and UE control with self care, carrying, lifting, driving, computer work.   [] (58838) Gait Re-education- Provided training and instruction to the patient for proper LE, core and proximal hip recruitment and positioning and eccentric body weight control with ambulation re-education including up and down stairs     Home Management Training / Self Care:  [] (69732) Provided self-care/home management training related to activities of daily living and compensatory training, and/or use of adaptive equipment for improvement with: ADLs and compensatory training, meal preparation, safety procedures and instruction in use of adaptive equipment, including bathing, grooming, dressing, personal hygiene, basic household cleaning and chores.      Home Exercise Program:    [x] (56140) Reviewed/Progressed HEP activities related to strengthening, flexibility, endurance, ROM of   [] LE / Lumbar: core, proximal hip and LE for functional self-care, mobility, lifting and ambulation/stair navigation   [x] UE / Cervical: cervical, postural, scapular, scapulothoracic and UE control with self care, reaching, carrying, lifting, house/yardwork, driving, computer work  [] (66394)Reviewed/Progressed HEP activities related to improving balance, coordination, kinesthetic sense, posture, motor skill, proprioception of   [] LE: core, proximal hip and LE for self care, mobility, lifting, and ambulation/stair navigation    [] UE / Cervical: cervical, postural,  scapular, scapulothoracic and UE control with self care, reaching, carrying, lifting, house/yardwork, driving, computer work    Manual Treatments:  PROM / STM / Oscillations-Mobs:  G-I, II, III, IV (PA's, Inf., Post.)  [x] (97837) Provided manual therapy to mobilize LE, proximal hip and/or LS spine soft tissue/joints for the purpose of modulating pain, promoting relaxation,  increasing ROM, reducing/eliminating soft tissue swelling/inflammation/restriction, improving soft tissue extensibility and allowing for proper ROM for normal function with   [] LE / lumbar: self care, mobility, lifting and ambulation. [x] UE / Cervical: self care, reaching, carrying, lifting, house/yardwork, driving, computer work. Modalities:  [] (33338) Vasopneumatic compression: Utilized vasopneumatic compression to decrease edema / swelling for the purpose of improving mobility and quad tone / recruitment which will allow for increased overall function including but not limited to self-care, transfers, ambulation, and ascending / descending stairs. Charges:  Timed Code Treatment Minutes: 45   Total Treatment Minutes: 45     [] EVAL - LOW (28745)   [] EVAL - MOD (30745)  [] EVAL - HIGH (21275)  [] RE-EVAL (12242)  [x] ME(36604) x 1      [] Ionto  [x] NMR (35884) x 1       [] Vaso  [x] Manual (11051) x 1       [] Ultrasound  [] TA x        [] Mech Traction (00368)  [] Aquatic Therapy x      [] ES (un) (77573):   [] Home Management Training x  [] ES(attended) (87405)   [] Dry Needling 1-2 muscles (79621):  [] Dry Needling 3+ muscles (326602)  [] Group:      [] Other:     GOALS:  Patient stated goal: to abolish R arm pain; increase neck movement/mobility  []? Progressing: []? Met: []? Not Met: []? Adjusted     Therapist goals for Patient:   Short Term Goals: To be achieved in: 2 weeks  1. Independent in HEP and progression per patient tolerance, in order to prevent re-injury. []? Progressing: []?  Met: []? Not Met: []? Adjusted  2. Patient will have a decrease in pain to <2/10 on average to facilitate improvement in movement, function, and ADLs as indicated by Functional Deficits. []? Progressing: []? Met: []? Not Met: []? Adjusted     Long Term Goals: To be achieved in: 4 weeks  1. Disability index score of 6% or less for the NDI to assist with reaching prior level of function. []? Progressing: []? Met: []? Not Met: []? Adjusted  2. Patient will demonstrate increased cervical extension AROM to 42, >45deg cervical flexion without pain and cervical rotation of >45deg R/L to allow for proper joint functioning as indicated by patients Functional Deficits. []? Progressing: []? Met: []? Not Met: []? Adjusted  3. Patient will demonstrate an increase in Strength to 5/5 to allow for proper functional mobility as indicated by patients Functional Deficits. []? Progressing: []? Met: []? Not Met: []? Adjusted  4. Patient will return to functional activities including computer work >30 min without increased symptoms or restriction. []? Progressing: []? Met: []? Not Met: []? Adjusted  5. Patient will have a decrease in pain to 0/10 on average to facilitate improvement in movement, function, and ADLs as indicated by Functional Deficits. []? Progressing: []? Met: []? Not Met: []? Adjusted    Overall Progression Towards Functional goals/ Treatment Progress Update:  [x] Patient is progressing as expected towards functional goals listed. [] Progression is slowed due to complexities/Impairments listed. [] Progression has been slowed due to co-morbidities.   [] Plan just implemented, too soon to assess goals progression <30days   [] Goals require adjustment due to lack of progress  [] Patient is not progressing as expected and requires additional follow up with physician  [] Other    Persisting Functional Limitations/Impairments:  [x]Sleeping []Sitting               []Standing []Transfers        []Walking []Kneeling               []Stairs []Squatting / bending   []ADLs []Reaching  []Lifting  []Housework  []Driving [x]Job related tasks  [x]Sports/Recreation []Other:      ASSESSMENT:  Pt had some increased stiffness with lateral flexion but with SO release, STM to traps and gentle mobs this improved. The patient was able to perform resisted scapular and RTC strengthening without increased symptoms and while maintaining good form. Pt reported increased focus on ER and RTC strength/ stability and protecting upper traps from assist. Pt tolerated progressions with high rows and posterior shoulder scapular stabilization with program today, reported felt it working appropriate muscle groups    Treatment/Activity Tolerance:  [x] Patient able to complete tx  [] Patient limited by fatigue  [] Patient limited by pain  [] Patient limited by other medical complications  [] Other:     Prognosis: [x] Good [] Fair  [] Poor    Patient Requires Follow-up: [x] Yes  [] No    Plan for next treatment session: SEE FLOW ABOVE    PLAN: PT 2x / week for 4 weeks. [x] Continue per plan of care [] Alter current plan (see comments)  [] Plan of care initiated [] Hold pending MD visit [] Discharge    Electronically signed by: Laura Quintana PTA, [id number]    Note: If patient does not return for scheduled/ recommended follow up visits, this note will serve as a discharge from care along with most recent update on progress.

## 2021-12-15 ENCOUNTER — HOSPITAL ENCOUNTER (OUTPATIENT)
Dept: PHYSICAL THERAPY | Age: 56
Setting detail: THERAPIES SERIES
Discharge: HOME | End: 2021-12-15
Payer: COMMERCIAL

## 2021-12-15 PROCEDURE — 97140 MANUAL THERAPY 1/> REGIONS: CPT

## 2021-12-15 PROCEDURE — 97110 THERAPEUTIC EXERCISES: CPT

## 2021-12-15 PROCEDURE — 97530 THERAPEUTIC ACTIVITIES: CPT

## 2021-12-15 NOTE — FLOWSHEET NOTE
[facility]  Outpatient Physical Therapy  Phone: [phone]   Fax: [phone]    Physical Therapy Daily Treatment Note  Date:  12/15/2021    Patient Name:  Delon London    :  1965  MRN: [id number]  Medical/Treatment Diagnosis Information:  · Diagnosis: M25.511 (ICD-10-CM) - Right shoulder pain, unspecified chronicity  · Treatment Diagnosis: neck pain, referred shoulder pain, decreased shoulder ROM, impaired posture, decreased cervical ROM, decreased cervical joint mobility  Insurance/Certification information:  PT Insurance Information: [facility] MET - $30 [facility]  Physician Information:  Referring Practitioner: Beryle Hau, MD   Plan of care signed (Y/N): [x]  Yes []  No     Date of Patient follow up with Physician: Lucila Moreno     Progress Report: []  Yes  [x]  No     Date Range for reporting period:  Beginnin21  Ending: TBD    Progress report due (10 Rx/or 30 days whichever is less): visit #8 or     Recertification due (POC duration/ or 90 days whichever is less): visit #8 or     Visit # Insurance Allowable Auth required? Date Range   4 61 PCY [x]  Yes?  []  No      Latex Allergy:  [x]NO      []YES  Preferred Language for Healthcare:   [x]English       []other:    Functional Scale:       Date assessed:  NDI: raw score = 6; dysfunction = 12%  21    Pain level:  0/10 more stiffness than pain      SUBJECTIVE:  Stiffness in neck is bigger issue than pain. Pat Rodriguez  PRN. Doing HEP as directed, up to twice a day.      OBJECTIVE:   12/15 - 126deg abd, 160deg flex, 30deg ext, 50deg flex   - 40deg flexion, 35deg ext, 25deg SB L, 26deg SB R //manual// 32deg R SB, 28deg L SB, 40deg flex, 38deg ext    RESTRICTIONS/PRECAUTIONS:     Exercises/Interventions: 45  Therapeutic Exercises (23869) 25' Resistance / level Sets/sec Reps Notes   Standing Rows  EOB 5# 2 10x 12/10   Cervical retraction     scap retractions     R UT stretch    TB rows TB extensions Doorframe pec stretch: Wide  High    10''  10''   2  2 12/8 - modified to patient's preference   Bilateral UE ER TB Blue 2 10 12/15 ^ resist   TB Low T's SL ER / SL punch CC high rows Cervical SNAGs       TB [place] Press orange 2 5 12/15 - T.C. for form, demo'd   Levator Scapula Stretch HEP TB 5-way lime 2 5 12/15          Therapeutic Activities (07374)       UBE Fwd/Retro RPM 30 1.5min fwd/retro  12/16 - added   Sustained OH Work  9 nuts bolts  12/15 - added                        Neuromuscular Re-ed (19863)       Cervical tucks  Cervical tuck & lifts  8'' 8 12/15 - ^ time; Pb Kelly W's                Manual Intervention (71838) - 15'       Consider prone C/S PA mobs G3-4 5 mobs 3 passes, C2-7    Prone T/S mobs G3-4 5 mobs 3 passes T1-9    CT junction mobs/manip G3-5 8 mobs, 1 manip R/L C6-T2 targeted    Brief R/L Shoulder PROM  1' ea     UT/LS R/L   20'' ea, R/L    R shoulder Post/inf GHJ mobs G3-4 10x ea     Cervical manual stretching into flexion/ STM to bilateral traps/ SO release/grade 1-2 gentle mobs cervical spine         Modalities:     Pt. Education:  -patient educated on diagnosis, prognosis and expectations for rehab  -all patient questions were answered    Home Exercise Program:  Access Code: [email or web address]  Blue band provided  URL: [email or web address]  Date: 12/08/2021  Prepared by: Amita Rush    Exercises  Seated Passive Cervical Retraction - 1-3 x daily - 5 x weekly - 1-3 sets - 5 reps - 2-3 hold  Seated Scapular Retraction - 1-3 x daily - 5 x weekly - 13 sets - 5 reps - 3 hold  Seated Upper Trapezius Stretch - 1-3 x daily - 5 x weekly - 1 sets - 2-3 reps - 10 hold  Doorway Pec Stretch at 90 Degrees Abduction - 1-3 x daily - 5 x weekly - 1 sets - 4 reps - 10 hold  Standing Row with Anchored Resistance - 1 x daily - 4 x weekly - 3 sets - 10 reps  Shoulder Extension with Resistance - 1 x daily - 4 x weekly - 3 sets - 10 reps  Shoulder External Rotation and Scapular Retraction with Resistance - 1 x daily - 4 x weekly - 3 sets - 15 reps  Seated Levator Scapulae Stretch - 1-3 x daily - 5 x weekly - 1 sets - 2-3 reps - 10 hold    Access Code: [email or web address]  URL: [employer]/  Date: 11/26/2021  Prepared by: Rachele Mcneil     Exercises  Seated Passive Cervical Retraction - 1-3 x daily - 5 x weekly - 1-3 sets - 5 reps - 2-3 hold  Seated Scapular Retraction - 1-3 x daily - 5 x weekly - 13 sets - 5 reps - 3 hold  Seated Upper Trapezius Stretch - 1-3 x daily - 5 x weekly - 1 sets - 2-3 reps - 10 hold  Doorway Pec Stretch at 90 Degrees Abduction - 1-3 x daily - 5 x weekly - 1 sets - 4 reps - 10 hold  Standing Row with Anchored Resistance - 1 x daily - 4 x weekly - 3 sets - 10 reps  Shoulder Extension with Resistance - 1 x daily - 4 x weekly - 3 sets - 10 reps    Therapeutic Exercise and NMR EXR  [x] (37052) Provided verbal/tactile cueing for activities related to strengthening, flexibility, endurance, ROM for improvements in  [] LE / Lumbar: LE, proximal hip, and core control with self care, mobility, lifting, ambulation. [x] UE / Cervical: cervical, postural, scapular, scapulothoracic and UE control with self care, reaching, carrying, lifting, house/yardwork, driving, computer work. [x] (19572) Provided verbal/tactile cueing for activities related to improving balance, coordination, kinesthetic sense, posture, motor skill, proprioception to assist with   [] LE / lumbar: LE, proximal hip, and core control in self care, mobility, lifting, ambulation and eccentric single leg control.    [x] UE / cervical: cervical, scapular, scapulothoracic and UE control with self care, reaching, carrying, lifting, house/yardwork, driving, computer work.   [] (33628) Therapist is in constant attendance of 2 or more patients providing skilled therapy interventions, but not providing any significant amount of measurable one-on-one time to either patient, for improvements in  [] LE / lumbar: LE, proximal hip, and core control in self care, mobility, lifting, ambulation and eccentric single leg control. [] UE / cervical: cervical, scapular, scapulothoracic and UE control with self care, reaching, carrying, lifting, house/yardwork, driving, computer work. NMR and Therapeutic Activities:    [x] (71919 or 20082) Provided verbal/tactile cueing for activities related to improving balance, coordination, kinesthetic sense, posture, motor skill, proprioception and motor activation to allow for proper function of   [] LE: / Lumbar core, proximal hip and LE with self care and ADLs  [x] UE / Cervical: cervical, postural, scapular, scapulothoracic and UE control with self care, carrying, lifting, driving, computer work.   [] (93205) Gait Re-education- Provided training and instruction to the patient for proper LE, core and proximal hip recruitment and positioning and eccentric body weight control with ambulation re-education including up and down stairs     Home Management Training / Self Care:  [] (31230) Provided self-care/home management training related to activities of daily living and compensatory training, and/or use of adaptive equipment for improvement with: ADLs and compensatory training, meal preparation, safety procedures and instruction in use of adaptive equipment, including bathing, grooming, dressing, personal hygiene, basic household cleaning and chores.      Home Exercise Program:    [] (47161) Reviewed/Progressed HEP activities related to strengthening, flexibility, endurance, ROM of   [] LE / Lumbar: core, proximal hip and LE for functional self-care, mobility, lifting and ambulation/stair navigation   [] UE / Cervical: cervical, postural, scapular, scapulothoracic and UE control with self care, reaching, carrying, lifting, house/yardwork, driving, computer work  [] (66611)Reviewed/Progressed HEP activities related to improving balance, coordination, kinesthetic sense, posture, motor skill, proprioception of   [] LE: core, proximal hip and LE for self care, mobility, lifting, and ambulation/stair navigation    [] UE / Cervical: cervical, postural,  scapular, scapulothoracic and UE control with self care, reaching, carrying, lifting, house/yardwork, driving, computer work    Manual Treatments:  PROM / STM / Oscillations-Mobs:  G-I, II, III, IV (PA's, Inf., Post.)  [x] (46337) Provided manual therapy to mobilize LE, proximal hip and/or LS spine soft tissue/joints for the purpose of modulating pain, promoting relaxation,  increasing ROM, reducing/eliminating soft tissue swelling/inflammation/restriction, improving soft tissue extensibility and allowing for proper ROM for normal function with   [] LE / lumbar: self care, mobility, lifting and ambulation. [x] UE / Cervical: self care, reaching, carrying, lifting, house/yardwork, driving, computer work. Modalities:  [] (02853) Vasopneumatic compression: Utilized vasopneumatic compression to decrease edema / swelling for the purpose of improving mobility and quad tone / recruitment which will allow for increased overall function including but not limited to self-care, transfers, ambulation, and ascending / descending stairs. Charges:  Timed Code Treatment Minutes: 45   Total Treatment Minutes: 45     [] EVAL - LOW (03329)   [] EVAL - MOD (33046)  [] EVAL - HIGH (56925)  [] RE-EVAL (65679)  [x] PB(16330) x 1      [] Ionto  [] NMR (81143) x        [] Vaso  [x] Manual (92026) x 1       [] Ultrasound  [x] TA x 1       [] Mech Traction (50926)  [] Aquatic Therapy x      [] ES (un) (24438):   [] Home Management Training x  [] ES(attended) (23999)   [] Dry Needling 1-2 muscles (75808):  [] Dry Needling 3+ muscles (756572)  [] Group:      [] Other:     GOALS:  Patient stated goal: to abolish R arm pain; increase neck movement/mobility  [x]? Progressing: []? Met: []? Not Met: []? Adjusted     Therapist goals for Patient:   Short Term Goals: To be achieved in: 2 weeks  1. Independent in HEP and progression per patient tolerance, in order to prevent re-injury. []? Progressing: [x]? Met: []? Not Met: []? Adjusted  2. Patient will have a decrease in pain to <2/10 on average to facilitate improvement in movement, function, and ADLs as indicated by Functional Deficits. []? Progressing: [x]? Met: []? Not Met: []? Adjusted     Long Term Goals: To be achieved in: 4 weeks  1. Disability index score of 6% or less for the NDI to assist with reaching prior level of function. [x]? Progressing: []? Met: []? Not Met: []? Adjusted  2. Patient will demonstrate increased cervical extension AROM to 42, >45deg cervical flexion without pain and cervical rotation of >45deg R/L to allow for proper joint functioning as indicated by patients Functional Deficits. [x]? Progressing: []? Met: []? Not Met: []? Adjusted  3. Patient will demonstrate an increase in Strength to 5/5 to allow for proper functional mobility as indicated by patients Functional Deficits. [x]? Progressing: []? Met: []? Not Met: []? Adjusted  4. Patient will return to functional activities including computer work >30 min without increased symptoms or restriction. [x]? Progressing: []? Met: []? Not Met: []? Adjusted  5. Patient will have a decrease in pain to 0/10 on average to facilitate improvement in movement, function, and ADLs as indicated by Functional Deficits. [x]? Progressing: []? Met: []? Not Met: []? Adjusted    Overall Progression Towards Functional goals/ Treatment Progress Update:  [x] Patient is progressing as expected towards functional goals listed. [] Progression is slowed due to complexities/Impairments listed. [] Progression has been slowed due to co-morbidities.   [] Plan just implemented, too soon to assess goals progression <30days   [] Goals require adjustment due to lack of progress  [] Patient is not progressing as expected and requires additional follow up with physician  [] Other    Persisting Functional Limitations/Impairments:  [x]Sleeping []Sitting               []Standing []Transfers        []Walking []Kneeling               []Stairs []Squatting / bending   []ADLs []Reaching  []Lifting  []Housework  []Driving [x]Job related tasks  [x]Sports/Recreation []Other:      ASSESSMENT:  Manual interventions helped to loosen cervical spine and shoulders up. The patient's flowsheet was progressed with increased resistance and TA involving overhead work. The patient excelled in these areas, but required V.C. and T.C. for form with [place]. The patient is improving with PT services, marked by improved mobility, less pain. Treatment/Activity Tolerance:  [x] Patient able to complete tx  [] Patient limited by fatigue  [] Patient limited by pain  [] Patient limited by other medical complications  [] Other:     Prognosis: [x] Good [] Fair  [] Poor    Patient Requires Follow-up: [x] Yes  [] No    Plan for next treatment session: SCAPULAR STABILIZATION, NECK MOBILITY, T/S MOBILITY    PLAN: PT 2x / week for 4 weeks. [x] Continue per plan of care [] Alter current plan (see comments)  [] Plan of care initiated [] Hold pending MD visit [] Discharge    Electronically signed by: Ramana Dewitt PT, [id number]    Note: If patient does not return for scheduled/ recommended follow up visits, this note will serve as a discharge from care along with most recent update on progress.

## 2021-12-17 ENCOUNTER — HOSPITAL ENCOUNTER (OUTPATIENT)
Dept: PHYSICAL THERAPY | Age: 56
Setting detail: THERAPIES SERIES
Discharge: HOME | End: 2021-12-17
Payer: COMMERCIAL

## 2021-12-17 PROCEDURE — 97110 THERAPEUTIC EXERCISES: CPT | Performed by: SPECIALIST/TECHNOLOGIST

## 2021-12-17 PROCEDURE — 97112 NEUROMUSCULAR REEDUCATION: CPT | Performed by: SPECIALIST/TECHNOLOGIST

## 2021-12-17 NOTE — FLOWSHEET NOTE
[facility]  Outpatient Physical Therapy  Phone: [phone]   Fax: [phone]    Physical Therapy Daily Treatment Note  Date:  2021    Patient Name:  Katie Villa    :  1965  MRN: [id number]  Medical/Treatment Diagnosis Information:  · Diagnosis: M25.511 (ICD-10-CM) - Right shoulder pain, unspecified chronicity  · Treatment Diagnosis: neck pain, referred shoulder pain, decreased shoulder ROM, impaired posture, decreased cervical ROM, decreased cervical joint mobility  Insurance/Certification information:  PT Insurance Information: [facility] MET - $30 [facility]  Physician Information:  Referring Practitioner: Wilfred Marte MD   Plan of care signed (Y/N): [x]  Yes []  No     Date of Patient follow up with Physician: Lizeth Chen     Progress Report: []  Yes  [x]  No     Date Range for reporting period:  Beginnin21  Ending: TBD    Progress report due (10 Rx/or 30 days whichever is less): visit #8 or     Recertification due (POC duration/ or 90 days whichever is less): visit #8 or     Visit # Insurance Allowable Auth required? Date Range   5 61 PCY [x]  Yes?  []  No      Latex Allergy:  [x]NO      []YES  Preferred Language for Healthcare:   [x]English       []other:    Functional Scale:       Date assessed:  NDI: raw score = 6; dysfunction = 12%  21    Pain level:  0/10 more stiffness than pain      SUBJECTIVE:  Continues to have neck stiffness in the am hours but feels sleeping on a wedge and then an older pillow doesn't help his neck. May look at a new pillow and possibly changing how hes sleeping with wedge or location of wedge under the mattress.       OBJECTIVE:   12/15 - 126deg abd, 160deg flex, 30deg ext, 50deg flex   - 40deg flexion, 35deg ext, 25deg SB L, 26deg SB R //manual// 32deg R SB, 28deg L SB, 40deg flex, 38deg ext    RESTRICTIONS/PRECAUTIONS:     Exercises/Interventions: 45  Therapeutic Exercises (01277) 25' Resistance / level Sets/sec Reps Notes   Standing Rows  EOB 5# 2 10x 12/10   Cervical retraction     scap retractions     R UT stretch    TB rows TB extensions Doorframe pec stretch: Wide  High    10''  10''   2  2 12/8 - modified to patient's preference   Bilateral UE ER TB Blue 2 10 12/15 ^ resist   TB Low T's SL ER / SL punch AD  3'12/17CC high rows/med rows   Left arm Extension/ ER L  35#  10#2 10x 12/17   Cervical SNAGs       TB Ukrainian Press orange 2 5 12/15 - T.C. for form, demo'd   Levator Scapula Stretch HEP TB 5-way lime 2 5 12/15          Therapeutic Activities (33989)       Sustained OH Work  9 nuts bolts  12/15 - added                        Neuromuscular Re-ed (83349)  15'       Cervical tucks  Cervical tuck & lifts  8'' 8 12/15 - ^ time; Tiarra Curran W's  Standing scaption with ball behind head to isolate cervical extension  2 10x 12/17   Supine TB D2 flexion  lime 2 10x 12/17   Supine @ 90 ER/IR  Lime 2 10x 12/17   Wall series  Lime 2 10x 12/17   BB yellow B flexion   15\" 5x 12/17   Manual Intervention (67039) -                               Modalities:     Pt. Education:  -patient educated on diagnosis, prognosis and expectations for rehab  -all patient questions were answered    Home Exercise Program:  Access Code: [email or web address]  Blue band provided  URL: [email or web address]  Date: 12/08/2021  Prepared by: Bearl Dense    Exercises  Seated Passive Cervical Retraction - 1-3 x daily - 5 x weekly - 1-3 sets - 5 reps - 2-3 hold  Seated Scapular Retraction - 1-3 x daily - 5 x weekly - 13 sets - 5 reps - 3 hold  Seated Upper Trapezius Stretch - 1-3 x daily - 5 x weekly - 1 sets - 2-3 reps - 10 hold  Doorway Pec Stretch at 90 Degrees Abduction - 1-3 x daily - 5 x weekly - 1 sets - 4 reps - 10 hold  Standing Row with Anchored Resistance - 1 x daily - 4 x weekly - 3 sets - 10 reps  Shoulder Extension with Resistance - 1 x daily - 4 x weekly - 3 sets - 10 reps  Shoulder External Rotation and Scapular Retraction with Resistance - 1 x daily - 4 x weekly - 3 sets - 15 reps  Seated Levator Scapulae Stretch - 1-3 x daily - 5 x weekly - 1 sets - 2-3 reps - 10 hold    Access Code: [email or web address]  URL: [email or web address]  Date: 11/26/2021  Prepared by: Brittney Abdullahi     Exercises  Seated Passive Cervical Retraction - 1-3 x daily - 5 x weekly - 1-3 sets - 5 reps - 2-3 hold  Seated Scapular Retraction - 1-3 x daily - 5 x weekly - 13 sets - 5 reps - 3 hold  Seated Upper Trapezius Stretch - 1-3 x daily - 5 x weekly - 1 sets - 2-3 reps - 10 hold  Doorway Pec Stretch at 90 Degrees Abduction - 1-3 x daily - 5 x weekly - 1 sets - 4 reps - 10 hold  Standing Row with Anchored Resistance - 1 x daily - 4 x weekly - 3 sets - 10 reps  Shoulder Extension with Resistance - 1 x daily - 4 x weekly - 3 sets - 10 reps    Therapeutic Exercise and NMR EXR  [x] (06480) Provided verbal/tactile cueing for activities related to strengthening, flexibility, endurance, ROM for improvements in  [] LE / Lumbar: LE, proximal hip, and core control with self care, mobility, lifting, ambulation. [x] UE / Cervical: cervical, postural, scapular, scapulothoracic and UE control with self care, reaching, carrying, lifting, house/yardwork, driving, computer work. [x] (61117) Provided verbal/tactile cueing for activities related to improving balance, coordination, kinesthetic sense, posture, motor skill, proprioception to assist with   [] LE / lumbar: LE, proximal hip, and core control in self care, mobility, lifting, ambulation and eccentric single leg control.    [x] UE / cervical: cervical, scapular, scapulothoracic and UE control with self care, reaching, carrying, lifting, house/yardwork, driving, computer work.   [] (12501) Therapist is in constant attendance of 2 or more patients providing skilled therapy interventions, but not providing any significant amount of measurable one-on-one time to either patient, for improvements in  [] LE / lumbar: LE, proximal hip, and core control in self care, mobility, lifting, ambulation and eccentric single leg control. [] UE / cervical: cervical, scapular, scapulothoracic and UE control with self care, reaching, carrying, lifting, house/yardwork, driving, computer work. NMR and Therapeutic Activities:    [x] (76547 or 22290) Provided verbal/tactile cueing for activities related to improving balance, coordination, kinesthetic sense, posture, motor skill, proprioception and motor activation to allow for proper function of   [] LE: / Lumbar core, proximal hip and LE with self care and ADLs  [x] UE / Cervical: cervical, postural, scapular, scapulothoracic and UE control with self care, carrying, lifting, driving, computer work.   [] (77988) Gait Re-education- Provided training and instruction to the patient for proper LE, core and proximal hip recruitment and positioning and eccentric body weight control with ambulation re-education including up and down stairs     Home Management Training / Self Care:  [] (82247) Provided self-care/home management training related to activities of daily living and compensatory training, and/or use of adaptive equipment for improvement with: ADLs and compensatory training, meal preparation, safety procedures and instruction in use of adaptive equipment, including bathing, grooming, dressing, personal hygiene, basic household cleaning and chores.      Home Exercise Program:    [] (27669) Reviewed/Progressed HEP activities related to strengthening, flexibility, endurance, ROM of   [] LE / Lumbar: core, proximal hip and LE for functional self-care, mobility, lifting and ambulation/stair navigation   [] UE / Cervical: cervical, postural, scapular, scapulothoracic and UE control with self care, reaching, carrying, lifting, house/yardwork, driving, computer work  [] (18001)Reviewed/Progressed HEP activities related to improving balance, coordination, kinesthetic sense, posture, motor skill, proprioception of   [] LE: core, proximal hip and LE for self care, mobility, lifting, and ambulation/stair navigation    [] UE / Cervical: cervical, postural,  scapular, scapulothoracic and UE control with self care, reaching, carrying, lifting, house/yardwork, driving, computer work    Manual Treatments:  PROM / STM / Oscillations-Mobs:  G-I, II, III, IV (PA's, Inf., Post.)  [x] (27328) Provided manual therapy to mobilize LE, proximal hip and/or LS spine soft tissue/joints for the purpose of modulating pain, promoting relaxation,  increasing ROM, reducing/eliminating soft tissue swelling/inflammation/restriction, improving soft tissue extensibility and allowing for proper ROM for normal function with   [] LE / lumbar: self care, mobility, lifting and ambulation. [x] UE / Cervical: self care, reaching, carrying, lifting, house/yardwork, driving, computer work. Modalities:  [] (78138) Vasopneumatic compression: Utilized vasopneumatic compression to decrease edema / swelling for the purpose of improving mobility and quad tone / recruitment which will allow for increased overall function including but not limited to self-care, transfers, ambulation, and ascending / descending stairs. Charges:  Timed Code Treatment Minutes: 40   Total Treatment Minutes: 40     [] EVAL - LOW (81592)   [] EVAL - MOD (07399)  [] EVAL - HIGH (46985)  [] RE-EVAL (19656)  [x] HB(85409) x 2      [] Ionto  [x] NMR (13112) x  1      [] Vaso  [] Manual (27204) x       [] Ultrasound  [] TA x       [] Mech Traction (40360)  [] Aquatic Therapy x      [] ES (un) (43303):   [] Home Management Training x  [] ES(attended) (14399)   [] Dry Needling 1-2 muscles (45066):  [] Dry Needling 3+ muscles (444389)  [] Group:      [] Other:     GOALS:  Patient stated goal: to abolish R arm pain; increase neck movement/mobility  [x]? Progressing: []? Met: []? Not Met: []? Adjusted     Therapist goals for Patient:   Short Term Goals: To be achieved in: 2 weeks  1.  Independent in HEP and progression per patient tolerance, in order to prevent re-injury. []? Progressing: [x]? Met: []? Not Met: []? Adjusted  2. Patient will have a decrease in pain to <2/10 on average to facilitate improvement in movement, function, and ADLs as indicated by Functional Deficits. []? Progressing: [x]? Met: []? Not Met: []? Adjusted     Long Term Goals: To be achieved in: 4 weeks  1. Disability index score of 6% or less for the NDI to assist with reaching prior level of function. [x]? Progressing: []? Met: []? Not Met: []? Adjusted  2. Patient will demonstrate increased cervical extension AROM to 42, >45deg cervical flexion without pain and cervical rotation of >45deg R/L to allow for proper joint functioning as indicated by patients Functional Deficits. [x]? Progressing: []? Met: []? Not Met: []? Adjusted  3. Patient will demonstrate an increase in Strength to 5/5 to allow for proper functional mobility as indicated by patients Functional Deficits. [x]? Progressing: []? Met: []? Not Met: []? Adjusted  4. Patient will return to functional activities including computer work >30 min without increased symptoms or restriction. [x]? Progressing: []? Met: []? Not Met: []? Adjusted  5. Patient will have a decrease in pain to 0/10 on average to facilitate improvement in movement, function, and ADLs as indicated by Functional Deficits. [x]? Progressing: []? Met: []? Not Met: []? Adjusted    Overall Progression Towards Functional goals/ Treatment Progress Update:  [x] Patient is progressing as expected towards functional goals listed. [] Progression is slowed due to complexities/Impairments listed. [] Progression has been slowed due to co-morbidities.   [] Plan just implemented, too soon to assess goals progression <30days   [] Goals require adjustment due to lack of progress  [] Patient is not progressing as expected and requires additional follow up with physician  [] Other    Persisting Functional Limitations/Impairments:  [x]Sleeping []Sitting               []Standing []Transfers        []Walking []Kneeling               []Stairs []Squatting / bending   []ADLs []Reaching  []Lifting  []Housework  []Driving [x]Job related tasks  [x]Sports/Recreation []Other:      ASSESSMENT:  Pt program addressed posterior shoulder and overhead strengthening today. Increased time spent on higher level strengthening with CC use and with TB in overhead planes. The patient's flowsheet was progressed with increased resistance and TA education involving education regarding pillows for sleeping. The patient is improving with PT services, marked by improved mobility, less pain. Treatment/Activity Tolerance:  [x] Patient able to complete tx  [] Patient limited by fatigue  [] Patient limited by pain  [] Patient limited by other medical complications  [] Other:     Prognosis: [x] Good [] Fair  [] Poor    Patient Requires Follow-up: [x] Yes  [] No    Plan for next treatment session: SCAPULAR STABILIZATION, NECK MOBILITY, T/S MOBILITY    PLAN: PT 2x / week for 4 weeks. Plans to purchase a new pillow and look at sleeping situation. [x] Continue per plan of care [] Alter current plan (see comments)  [] Plan of care initiated [] Hold pending MD visit [] Discharge    Electronically signed by: OZ Zaman,[id number]    Note: If patient does not return for scheduled/ recommended follow up visits, this note will serve as a discharge from care along with most recent update on progress.

## 2021-12-22 ENCOUNTER — HOSPITAL ENCOUNTER (OUTPATIENT)
Dept: PHYSICAL THERAPY | Age: 56
Setting detail: THERAPIES SERIES
Discharge: HOME | End: 2021-12-22
Payer: COMMERCIAL

## 2021-12-22 PROCEDURE — 97110 THERAPEUTIC EXERCISES: CPT

## 2021-12-22 PROCEDURE — 97530 THERAPEUTIC ACTIVITIES: CPT

## 2021-12-22 PROCEDURE — 97140 MANUAL THERAPY 1/> REGIONS: CPT

## 2021-12-22 NOTE — FLOWSHEET NOTE
3 15x 12/22 - added set/reps   AD  CC high rows/med rows   Left arm Extension/ ER L Cervical SNAGs TB [place]e Press Levator Scapula Stretch HEP TB 5-way lime 2 5 12/15   Prone T'S: palms down, thumbs up 3# 2 10 ea BUE 12/22 - supersets   Prone Y's 3# 2 10, BUE 12/22 - added   Bodyblade:   ER/IR yellow 15'' 5 12/22 - added          Therapeutic Activities (82039) 10'       UBE Fwd/Retro Sustained OH Work  Landmine Bar Single Arm Press 15kg 2 10 R/L 12/22 - added   's Carry 10#KB  7. 5#KB 2 50ft each 12/22 - added   TRX Bodyweight Rows  2 10 12/22 - added          Neuromuscular Re-ed (67193)         Cervical tucks  Cervical tuck & lifts  Wall W's  Standing scaption with ball behind head to isolate cervical extension Supine TB D2 flexion  Supine @ 90 ER/IR  Wall series  BB yellow B flexion          Manual Intervention (16249) - 15'       Consider prone C/S PA mobs G3-4 5 mobs 3 passes, C2-7    Prone T/S mobs G3-4 5 mobs 3 passes T1-9    CT junction mobs/manip G3-5 8 mobs, 1 manip R/L C6-T2 targeted    Supine Cervical Sideglides G3-4 5 mobs ea 2 passes C2-7; R/L 12/22   Seated CT Manip G5 1 manip  12/22                 Modalities:     Pt. Education:  -patient educated on diagnosis, prognosis and expectations for rehab  -all patient questions were answered    Home Exercise Program:  Access Code: [email or web address]  Blue band provided  URL: [email or web address]  Date: 12/08/2021  Prepared by: Xiomy Lechuga    Exercises  Seated Passive Cervical Retraction - 1-3 x daily - 5 x weekly - 1-3 sets - 5 reps - 2-3 hold  Seated Scapular Retraction - 1-3 x daily - 5 x weekly - 13 sets - 5 reps - 3 hold  Seated Upper Trapezius Stretch - 1-3 x daily - 5 x weekly - 1 sets - 2-3 reps - 10 hold  Doorway Pec Stretch at 90 Degrees Abduction - 1-3 x daily - 5 x weekly - 1 sets - 4 reps - 10 hold  Standing Row with Anchored Resistance - 1 x daily - 4 x weekly - 3 sets - 10 reps  Shoulder Extension with Resistance - 1 x daily - 4 x weekly - 3 sets - 10 reps  Shoulder External Rotation and Scapular Retraction with Resistance - 1 x daily - 4 x weekly - 3 sets - 15 reps  Seated Levator Scapulae Stretch - 1-3 x daily - 5 x weekly - 1 sets - 2-3 reps - 10 hold    Access Code: [email or web address]  URL: [email or web address]  Date: 11/26/2021  Prepared by: Haylie Miller     Exercises  Seated Passive Cervical Retraction - 1-3 x daily - 5 x weekly - 1-3 sets - 5 reps - 2-3 hold  Seated Scapular Retraction - 1-3 x daily - 5 x weekly - 13 sets - 5 reps - 3 hold  Seated Upper Trapezius Stretch - 1-3 x daily - 5 x weekly - 1 sets - 2-3 reps - 10 hold  Doorway Pec Stretch at 90 Degrees Abduction - 1-3 x daily - 5 x weekly - 1 sets - 4 reps - 10 hold  Standing Row with Anchored Resistance - 1 x daily - 4 x weekly - 3 sets - 10 reps  Shoulder Extension with Resistance - 1 x daily - 4 x weekly - 3 sets - 10 reps    Therapeutic Exercise and NMR EXR  [x] (12799) Provided verbal/tactile cueing for activities related to strengthening, flexibility, endurance, ROM for improvements in  [] LE / Lumbar: LE, proximal hip, and core control with self care, mobility, lifting, ambulation. [x] UE / Cervical: cervical, postural, scapular, scapulothoracic and UE control with self care, reaching, carrying, lifting, house/yardwork, driving, computer work. [x] (35836) Provided verbal/tactile cueing for activities related to improving balance, coordination, kinesthetic sense, posture, motor skill, proprioception to assist with   [] LE / lumbar: LE, proximal hip, and core control in self care, mobility, lifting, ambulation and eccentric single leg control.    [x] UE / cervical: cervical, scapular, scapulothoracic and UE control with self care, reaching, carrying, lifting, house/yardwork, driving, computer work.   [] (56558) Therapist is in constant attendance of 2 or more patients providing skilled therapy interventions, but not providing any significant amount of measurable one-on-one time to either patient, for improvements in  [] LE / lumbar: LE, proximal hip, and core control in self care, mobility, lifting, ambulation and eccentric single leg control. [] UE / cervical: cervical, scapular, scapulothoracic and UE control with self care, reaching, carrying, lifting, house/yardwork, driving, computer work. NMR and Therapeutic Activities:    [x] (26513 or 20860) Provided verbal/tactile cueing for activities related to improving balance, coordination, kinesthetic sense, posture, motor skill, proprioception and motor activation to allow for proper function of   [] LE: / Lumbar core, proximal hip and LE with self care and ADLs  [x] UE / Cervical: cervical, postural, scapular, scapulothoracic and UE control with self care, carrying, lifting, driving, computer work.   [] (81025) Gait Re-education- Provided training and instruction to the patient for proper LE, core and proximal hip recruitment and positioning and eccentric body weight control with ambulation re-education including up and down stairs     Home Management Training / Self Care:  [] (68804) Provided self-care/home management training related to activities of daily living and compensatory training, and/or use of adaptive equipment for improvement with: ADLs and compensatory training, meal preparation, safety procedures and instruction in use of adaptive equipment, including bathing, grooming, dressing, personal hygiene, basic household cleaning and chores.      Home Exercise Program:    [] (01062) Reviewed/Progressed HEP activities related to strengthening, flexibility, endurance, ROM of   [] LE / Lumbar: core, proximal hip and LE for functional self-care, mobility, lifting and ambulation/stair navigation   [] UE / Cervical: cervical, postural, scapular, scapulothoracic and UE control with self care, reaching, carrying, lifting, house/yardwork, driving, computer work  [] (36689)Reviewed/Progressed HEP activities related to improving balance, coordination, kinesthetic sense, posture, motor skill, proprioception of   [] LE: core, proximal hip and LE for self care, mobility, lifting, and ambulation/stair navigation    [] UE / Cervical: cervical, postural,  scapular, scapulothoracic and UE control with self care, reaching, carrying, lifting, house/yardwork, driving, computer work    Manual Treatments:  PROM / STM / Oscillations-Mobs:  G-I, II, III, IV (PA's, Inf., Post.)  [x] (26482) Provided manual therapy to mobilize LE, proximal hip and/or LS spine soft tissue/joints for the purpose of modulating pain, promoting relaxation,  increasing ROM, reducing/eliminating soft tissue swelling/inflammation/restriction, improving soft tissue extensibility and allowing for proper ROM for normal function with   [] LE / lumbar: self care, mobility, lifting and ambulation. [x] UE / Cervical: self care, reaching, carrying, lifting, house/yardwork, driving, computer work. Modalities:  [] (65470) Vasopneumatic compression: Utilized vasopneumatic compression to decrease edema / swelling for the purpose of improving mobility and quad tone / recruitment which will allow for increased overall function including but not limited to self-care, transfers, ambulation, and ascending / descending stairs. Charges:  Timed Code Treatment Minutes: 45   Total Treatment Minutes: 45     [] EVAL - LOW (31796)   [] EVAL - MOD (90351)  [] EVAL - HIGH (26359)  [] RE-EVAL (97371)  [x] TT(51126) x 1      [] Ionto  [] NMR (75025) x       [] Vaso  [x] Manual (35856) x 1       [] Ultrasound  [x] TA x 1       [] Mech Traction (51367)  [] Aquatic Therapy x      [] ES (un) (81013):   [] Home Management Training x  [] ES(attended) (00501)   [] Dry Needling 1-2 muscles (11263):  [] Dry Needling 3+ muscles (205039)  [] Group:      [] Other:     GOALS:  Patient stated goal: to abolish R arm pain; increase neck movement/mobility  [x]? Progressing: []? Met: []? Not Met: []?  Adjusted     Therapist goals for Patient:   Short Term Goals: To be achieved in: 2 weeks  1. Independent in HEP and progression per patient tolerance, in order to prevent re-injury. []? Progressing: [x]? Met: []? Not Met: []? Adjusted  2. Patient will have a decrease in pain to <2/10 on average to facilitate improvement in movement, function, and ADLs as indicated by Functional Deficits. []? Progressing: [x]? Met: []? Not Met: []? Adjusted     Long Term Goals: To be achieved in: 4 weeks  1. Disability index score of 6% or less for the NDI to assist with reaching prior level of function. [x]? Progressing: []? Met: []? Not Met: []? Adjusted  2. Patient will demonstrate increased cervical extension AROM to 42, >45deg cervical flexion without pain and cervical rotation of >45deg R/L to allow for proper joint functioning as indicated by patients Functional Deficits. [x]? Progressing: []? Met: []? Not Met: []? Adjusted  3. Patient will demonstrate an increase in Strength to 5/5 to allow for proper functional mobility as indicated by patients Functional Deficits. [x]? Progressing: []? Met: []? Not Met: []? Adjusted  4. Patient will return to functional activities including computer work >30 min without increased symptoms or restriction. [x]? Progressing: []? Met: []? Not Met: []? Adjusted  5. Patient will have a decrease in pain to 0/10 on average to facilitate improvement in movement, function, and ADLs as indicated by Functional Deficits. [x]? Progressing: []? Met: []? Not Met: []? Adjusted    Overall Progression Towards Functional goals/ Treatment Progress Update:  [x] Patient is progressing as expected towards functional goals listed. [] Progression is slowed due to complexities/Impairments listed. [] Progression has been slowed due to co-morbidities.   [] Plan just implemented, too soon to assess goals progression <30days   [] Goals require adjustment due to lack of progress  [] Patient is not progressing as expected and requires additional follow up with physician  [] Other    Persisting Functional Limitations/Impairments:  [x]Sleeping []Sitting               []Standing []Transfers        []Walking []Kneeling               []Stairs []Squatting / bending   []ADLs []Reaching  []Lifting  []Housework  []Driving [x]Job related tasks  [x]Sports/Recreation []Other:      ASSESSMENT:  PT addressed ongoing scapulohumeral strengthening, endurance and neck stability this date. Manual focused on T/S, C/T and C/S mobility. All exercises and interventions performed with proper form, control, and without increased symptoms. The patient's flowsheet was progressed with increased resistance and TA. The patient is improving with PT services, marked by improved mobility, less pain. Treatment/Activity Tolerance:  [x] Patient able to complete tx  [] Patient limited by fatigue  [] Patient limited by pain  [] Patient limited by other medical complications  [] Other:     Prognosis: [x] Good [] Fair  [] Poor    Patient Requires Follow-up: [x] Yes  [] No    Plan for next treatment session: SCAPULAR STABILIZATION, NECK MOBILITY, T/S MOBILITY    PLAN: PT 2x / week for 4 weeks. Plans to purchase a new pillow and look at sleeping situation. [x] Continue per plan of care [] Alter current plan (see comments)  [] Plan of care initiated [] Hold pending MD visit [] Discharge    Electronically signed by: Al Curry PT, [id number]  Note: If patient does not return for scheduled/ recommended follow up visits, this note will serve as a discharge from care along with most recent update on progress.

## 2021-12-27 ENCOUNTER — HOSPITAL ENCOUNTER (OUTPATIENT)
Dept: PHYSICAL THERAPY | Age: 56
Setting detail: THERAPIES SERIES
Discharge: HOME | End: 2021-12-27
Payer: COMMERCIAL

## 2021-12-27 PROCEDURE — 97140 MANUAL THERAPY 1/> REGIONS: CPT

## 2021-12-27 PROCEDURE — 97530 THERAPEUTIC ACTIVITIES: CPT

## 2021-12-27 PROCEDURE — 97110 THERAPEUTIC EXERCISES: CPT

## 2021-12-27 NOTE — PLAN OF CARE
[facility]  Outpatient Physical Therapy  Phone: [phone]   Fax: [phone]  Physical Therapy Re-Certification Plan of Care    Dear  Nerissa Thorne MD,    We had the pleasure of treating the following patient for physical therapy services at [facility] Outpatient [facility] Therapy. A summary of our findings can be found in the updated assessment below. This includes our plan of care. If you have any questions or concerns regarding these findings, please do not hesitate to contact me at the office phone number checked above. Thank you for the referral.     Physician Signature:________________________________Date:__________________  By signing above (or electronic signature), therapist's plan is approved by physician      Functional Outcome:   NDI = 2/50 = 4% dys     Cervical AROM with Inclinometer   Flexion: 50   Extension: 32    Side-Bending:   Right: 34         Left: 35   Rotation:   Right: 51 Left: 48     Neer: Negative  Jacob-Kumar: Negative    UE MMT:   Right:    GH Flexion:   5  GH aBduction:  5  GH IR:    5  GH ER:    5  Elbow Flexion:  5  Elbow Extension:   5  MT:    4+  Rhomboids:   4+  LT:    4+     GOALS:  Patient stated goal: to abolish R arm pain; increase neck movement/mobility  [x]? Progressing: []? Met: []? Not Met: []? Adjusted     Therapist goals for Patient:   Short Term Goals: To be achieved in: 2 weeks  1. Independent in HEP and progression per patient tolerance, in order to prevent re-injury. []? Progressing: [x]? Met: []? Not Met: []? Adjusted  2. Patient will have a decrease in pain to <2/10 on average to facilitate improvement in movement, function, and ADLs as indicated by Functional Deficits. []? Progressing: [x]? Met: []? Not Met: []? Adjusted     Long Term Goals: To be achieved in: 4 weeks   1. Disability index score of 6% or less for the NDI to assist with reaching prior level of function. []? Progressing: [x]?  Met: []? Not Met: []? Adjusted  2. Patient will demonstrate increased cervical extension AROM to 42, >45deg cervical flexion without pain and cervical rotation of >45deg R/L to allow for proper joint functioning as indicated by patients Functional Deficits. []? Progressing: [x]? Partially Met: []? Not Met: []? Adjusted  3. Patient will demonstrate an increase in Strength to 5/5 to allow for proper functional mobility as indicated by patients Functional Deficits. [x]? Progressing: []? Met: []? Not Met: []? Adjusted  4. Patient will return to functional activities including computer work >30 min without increased symptoms or restriction. []? Progressing: [x]? Met: []? Not Met: []? Adjusted  5. Patient will have a decrease in pain to 0/10 on average to facilitate improvement in movement, function, and ADLs as indicated by Functional Deficits. [x]? Progressing: []? Met: []? Not Met: []? Adjusted    Overall Response to Treatment:   [x]Patient is responding well to treatment and improvement is noted with regards to goals and will likely DC from PT after 1-2 more visits.    []Patient should continue to improve in reasonable time if they continue HEP   []Patient has plateaued and is no longer responding to skilled PT intervention    []Patient is getting worse and would benefit from return to referring MD   []Patient unable to adhere to initial POC    []Other:     Date range of Visits: 21 - 21  Total Visits: 7    Recommendation:    [x]Continue PT 1-2x / wk for 1-2 weeks                []Hold PT, pending MD visit        Physical Therapy Daily Treatment Note  Date:  2021    Patient Name:  Vanda Randle    :  1965  MRN: [id number]  Medical/Treatment Diagnosis Information:  · Diagnosis: M25.511 (ICD-10-CM) - Right shoulder pain, unspecified chronicity  · Treatment Diagnosis: neck pain, referred shoulder pain, decreased shoulder ROM, impaired posture, decreased cervical ROM, decreased cervical joint mobility  Insurance/Certification information:  PT Insurance Information: BCBS - DED MET - $30 [facility]  Physician Information:  Referring Practitioner: Re Page MD   Plan of care signed (Y/N): [x]  Yes []  No     Date of Patient follow up with Physician: Zack Garcia      Progress Report: []  Yes  [x]  No     Date Range for reporting period:  Beginnin21  POC: 21  Ending: TBD    Progress report due (10 Rx/or 30 days whichever is less): visit #8 or     Recertification due (POC duration/ or 90 days whichever is less): visit #8 or     Visit # Insurance Allowable Auth required? Date Range   7 61 PCY [x]  Yes?  []  No      Latex Allergy:  [x]NO      []YES  Preferred Language for Healthcare:   [x]English       []other:    Functional Scale:       Date assessed:  NDI: raw score = 6; dysfunction = 12%  21  NDI: raw score = 2; dysfunction = 4%  21    Pain level:  1/10      SUBJECTIVE:  SEE POC - The patient reports only 2 positions increase N&T in his hand, but pain is abolished in the shoulder. The patient reports he notices some slight neck pain when driving, but very mild, and only in certain fine positions. OBJECTIVE:    - SEE POC  12/15 - 126deg abd, 160deg flex, 30deg ext, 50deg flex   - 40deg flexion, 35deg ext, 25deg SB L, 26deg SB R //manual// 32deg R SB, 28deg L SB, 40deg flex, 38deg ext    RESTRICTIONS/PRECAUTIONS:     Exercises/Interventions:   Therapeutic Exercises (76453) Resistance / level Sets/sec Reps Notes   Standing Rows  EOB 5# Cervical retraction     scap retractions     R UT stretch    TB rows TB extensions Doorframe pec stretch:   Wide  High  Bilateral UE ER TB TB Low T's SL ER  CC underhand row 60# 2 15  - added   Cervical SNAGs TB [place] Press Levator Scapula Stretch  TB 5-way Prone T'S: palms down, thumbs up 3# 1 10, ea BUE   Prone Y's 3# 1 10, BUE   Bodyblade:   ER/IR TB ER Blue 1  1 25 slow  15 fast 12/27 - added          Therapeutic Activities (17872)        UBE Fwd/Retro Sustained OH Work  [employer] 15kg 2 10 R/L 12/22 - added   's Carry TRX Bodyweight Rows  2 10 12/22 - added   Standing OH Press 45kg 2 10 12/27 - added   The patient was provided a final assessment including evaluative tests and measures, as well as documentation to track progress, and was issued a 1-month [facility] Pass to continue HEP. 10' 12/27 - added          Neuromuscular Re-ed (51064)         Cervical tucks  Cervical tuck & lifts  Wall W's  Standing scaption with ball behind head to isolate cervical extension Supine TB D2 flexion  Supine @ 90 ER/IR  Wall series  BB yellow B flexion          Manual Intervention (51317) - 8'       Consider prone C/S PA mobs Prone T/S mobs G3-4 5 mobs 3 passes T1-9    CT junction mobs/manip G3-5 5 mobs, 1 manip R/L C6-T2 targeted    Supine Cervical Sideglides Seated CT Manip G5 1 manip  12/22                 Modalities:     Pt. Education:  -patient educated on diagnosis, prognosis and expectations for rehab  -all patient questions were answered    Home Exercise Program:  Access Code: [email or web address]  Blue band provided  URL: [email or web address]  Date: 12/08/2021  Prepared by: Rick Fat    Exercises  Seated Passive Cervical Retraction - 1-3 x daily - 5 x weekly - 1-3 sets - 5 reps - 2-3 hold  Seated Scapular Retraction - 1-3 x daily - 5 x weekly - 13 sets - 5 reps - 3 hold  Seated Upper Trapezius Stretch - 1-3 x daily - 5 x weekly - 1 sets - 2-3 reps - 10 hold  Doorway Pec Stretch at 90 Degrees Abduction - 1-3 x daily - 5 x weekly - 1 sets - 4 reps - 10 hold  Standing Row with Anchored Resistance - 1 x daily - 4 x weekly - 3 sets - 10 reps  Shoulder Extension with Resistance - 1 x daily - 4 x weekly - 3 sets - 10 reps  Shoulder External Rotation and Scapular Retraction with Resistance - 1 x daily - 4 x weekly - 3 sets - 15 reps  Seated Levator Scapulae Stretch - 1-3 x daily - 5 x weekly - 1 sets - 2-3 reps scapulothoracic and UE control with self care, reaching, carrying, lifting, house/yardwork, driving, computer work. NMR and Therapeutic Activities:    [x] (86623 or 06314) Provided verbal/tactile cueing for activities related to improving balance, coordination, kinesthetic sense, posture, motor skill, proprioception and motor activation to allow for proper function of   [] LE: / Lumbar core, proximal hip and LE with self care and ADLs  [x] UE / Cervical: cervical, postural, scapular, scapulothoracic and UE control with self care, carrying, lifting, driving, computer work.   [] (19700) Gait Re-education- Provided training and instruction to the patient for proper LE, core and proximal hip recruitment and positioning and eccentric body weight control with ambulation re-education including up and down stairs     Home Management Training / Self Care:  [] (09601) Provided self-care/home management training related to activities of daily living and compensatory training, and/or use of adaptive equipment for improvement with: ADLs and compensatory training, meal preparation, safety procedures and instruction in use of adaptive equipment, including bathing, grooming, dressing, personal hygiene, basic household cleaning and chores.      Home Exercise Program:    [] (71802) Reviewed/Progressed HEP activities related to strengthening, flexibility, endurance, ROM of   [] LE / Lumbar: core, proximal hip and LE for functional self-care, mobility, lifting and ambulation/stair navigation   [] UE / Cervical: cervical, postural, scapular, scapulothoracic and UE control with self care, reaching, carrying, lifting, house/yardwork, driving, computer work  [] (85763)Reviewed/Progressed HEP activities related to improving balance, coordination, kinesthetic sense, posture, motor skill, proprioception of   [] LE: core, proximal hip and LE for self care, mobility, lifting, and ambulation/stair navigation    [] UE / Cervical: cervical, postural,  scapular, scapulothoracic and UE control with self care, reaching, carrying, lifting, house/yardwork, driving, computer work    Manual Treatments:  PROM / STM / Oscillations-Mobs:  G-I, II, III, IV (PA's, Inf., Post.)  [x] (69392) Provided manual therapy to mobilize LE, proximal hip and/or LS spine soft tissue/joints for the purpose of modulating pain, promoting relaxation,  increasing ROM, reducing/eliminating soft tissue swelling/inflammation/restriction, improving soft tissue extensibility and allowing for proper ROM for normal function with   [] LE / lumbar: self care, mobility, lifting and ambulation. [x] UE / Cervical: self care, reaching, carrying, lifting, house/yardwork, driving, computer work. Modalities:  [] (10047) Vasopneumatic compression: Utilized vasopneumatic compression to decrease edema / swelling for the purpose of improving mobility and quad tone / recruitment which will allow for increased overall function including but not limited to self-care, transfers, ambulation, and ascending / descending stairs. Charges:  Timed Code Treatment Minutes: 45   Total Treatment Minutes: 45     [] EVAL - LOW (13254)   [] EVAL - MOD (36273)  [] EVAL - HIGH (53991)  [] RE-EVAL (36082)  [x] PX(59505) x 1      [] Ionto  [] NMR (37565) x       [] Vaso  [x] Manual (82198) x 1       [] Ultrasound  [x] TA x 1       [] Mech Traction (34872)  [] Aquatic Therapy x      [] ES (un) (18772):   [] Home Management Training x  [] ES(attended) (59532)   [] Dry Needling 1-2 muscles (25580):  [] Dry Needling 3+ muscles (877695)  [] Group:      [] Other:     GOALS:  Patient stated goal: to abolish R arm pain; increase neck movement/mobility  [x]? Progressing: []? Met: []? Not Met: []? Adjusted     Therapist goals for Patient:   Short Term Goals: To be achieved in: 2 weeks  1. Independent in HEP and progression per patient tolerance, in order to prevent re-injury. []? Progressing: [x]? Met: []?  Not Met: []? Limitations/Impairments:  [x]Sleeping []Sitting               []Standing []Transfers        []Walking []Kneeling               []Stairs []Squatting / bending   []ADLs []Reaching  []Lifting  []Housework  []Driving [x]Job related tasks  [x]Sports/Recreation []Other:      ASSESSMENT:  POC assessed and reviewed this date, indicating patient will be ready to DC at next visit. The patient's POC progressed with resisted scapulohumeral strengthening in positions requiring cervical stabilization, and the patient performed well without symptoms. The patient was also instructed in standing OH press w/ barbell, encouraging scapular setting through use of low traps once shoulders are fully flexed overhead. Manual again focused on T/S, C/T and C/S mobility. Treatment/Activity Tolerance:  [x] Patient able to complete tx  [] Patient limited by fatigue  [] Patient limited by pain  [] Patient limited by other medical complications  [] Other:     Prognosis: [x] Good [] Fair  [] Poor    Patient Requires Follow-up: [x] Yes  [] No    Plan for next treatment session: Arturo Lund. PROVIDE ANY BANDS NEEDED. FINAL HEP (IF ADJUSTMENT NEEDED). PLAN: PT 2x / week for 4 weeks. Plans to purchase a new pillow and look at sleeping situation. [x] Continue per plan of care [] Alter current plan (see comments)  [] Plan of care initiated [] Hold pending MD visit [] Discharge    Electronically signed by: Devora Benson, PT, [id number]  Note: If patient does not return for scheduled/ recommended follow up visits, this note will serve as a discharge from care along with most recent update on progress.

## 2021-12-29 ENCOUNTER — HOSPITAL ENCOUNTER (OUTPATIENT)
Dept: PHYSICAL THERAPY | Age: 56
Setting detail: THERAPIES SERIES
Discharge: HOME | End: 2021-12-29
Payer: COMMERCIAL

## 2021-12-29 PROCEDURE — 97530 THERAPEUTIC ACTIVITIES: CPT

## 2021-12-29 PROCEDURE — 97140 MANUAL THERAPY 1/> REGIONS: CPT

## 2021-12-29 PROCEDURE — 97110 THERAPEUTIC EXERCISES: CPT

## 2021-12-29 NOTE — DISCHARGE SUMMARY
average to facilitate improvement in movement, function, and ADLs as indicated by Functional Deficits. []?? Progressing: [x]? ? Met: []?? Not Met: []?? Adjusted     Long Term Goals: To be achieved in: 4 weeks   1. Disability index score of 6% or less for the NDI to assist with reaching prior level of function.   []?? Progressing: [x]? ? Met: []?? Not Met: []?? Adjusted  2. Patient will demonstrate increased cervical extension AROM to 42, >45deg cervical flexion without pain and cervical rotation of >45deg R/L to allow for proper joint functioning as indicated by patients Functional Deficits. []?? Progressing: [x]? ? Partially Met: []?? Not Met: []?? Adjusted  3. Patient will demonstrate an increase in Strength to 5/5 to allow for proper functional mobility as indicated by patients Functional Deficits. []?? Progressing: [x]? ? Partially Met: []?? Not Met: []?? Adjusted  4. Patient will return to functional activities including computer work >30 min without increased symptoms or restriction.   []?? Progressing: [x]? ? Met: []?? Not Met: []?? Adjusted  5. Patient will have a decrease in pain to 0/10 on average to facilitate improvement in movement, function, and ADLs as indicated by Functional Deficits. []?? Progressing: [x]? ? Met: []?? Not Met: []?? Adjusted     Overall Response to Treatment:              [x]? Patient is responding well to treatment and goals have been met. The patient will DC this date with [place]. The patient will contact MD or PT in case of new or unexpected symptoms.               []?Patient should continue to improve in reasonable time if they continue HEP              []?Patient has plateaued and is no longer responding to skilled PT intervention                   []?Patient is getting worse and would benefit from return to referring MD              []?Patient unable to adhere to initial POC               []?Other:      Date range of Visits: 11/26/21 - 12/27/21  Total Visits: 8     Recommendation:               [x]?DC    Physical Therapy Daily Treatment Note  Date:  2021    Patient Name:  Da Calvo    :  1965  MRN: [id number]  Medical/Treatment Diagnosis Information:  · Diagnosis: M25.511 (ICD-10-CM) - Right shoulder pain, unspecified chronicity  · Treatment Diagnosis: neck pain, referred shoulder pain, decreased shoulder ROM, impaired posture, decreased cervical ROM, decreased cervical joint mobility  Insurance/Certification information:  PT Insurance Information: [facility] MET - $30 [facility]  Physician Information:  Referring Practitioner: Sherly Negrete MD   Plan of care signed (Y/N): [x]  Yes []  No     Date of Patient follow up with Physician: Edgar Israel       Progress Report: [x]  Yes  []  No     Date Range for reporting period:  Beginnin21  POC: 21  Endin21    Progress report due (10 Rx/or 30 days whichever is less): visit DC    Recertification due (POC duration/ or 90 days whichever is less): visit DC    Visit # Insurance Allowable Auth required? Date Range   8 61 PCY [x]  Yes?  []  No      Latex Allergy:  [x]NO      []YES  Preferred Language for Healthcare:   [x]English       []other:    Functional Scale:       Date assessed:  NDI: raw score = 6; dysfunction = 12%  21  NDI: raw score = 2; dysfunction = 4%  21    Pain level:  1/10      SUBJECTIVE:  SEE DC     OBJECTIVE:    - SEE DC   - SEE POC  12/15 - 126deg abd, 160deg flex, 30deg ext, 50deg flex   - 40deg flexion, 35deg ext, 25deg SB L, 26deg SB R //manual// 32deg R SB, 28deg L SB, 40deg flex, 38deg ext    RESTRICTIONS/PRECAUTIONS:     Exercises/Interventions:   Therapeutic Exercises (30387) Resistance / level Sets/sec Reps Notes   Standing Rows  EOB Cervical retraction    scap retractions    R UT stretch    TB rows TB extensions Doorframe pec stretch:   Wide  High Bilateral UE ER TB TB Low T's SL ER  CC underhand row Cervical SNAGs TB [place] Press Levator Scapula Stretch  TB 5-way Prone T'S: palms down, thumbs up Prone Y's Bodyblade:   ER/IR TB ER Blue 2 Fatigue 12/29 - ^ reps   CC Lat Row 80# 3 10 12/29 - superset w/ OH press   Hooklying dB Bench Press 10# BUE 3 15 12/29 - added          Therapeutic Activities (79827)        UBE Fwd/Retro Sustained OH Work  [employer] [place] 45kg 2 10 R/L 12/29 - added; superset R/L   's Carry TRX Bodyweight Rows  Standing OH Press 45kg 3 10 12/27 - added set; superset w/ lat row   The patient was provided a final assessment including evaluative tests and measures, as well as documentation to track progress, and was issued a 1-month [employer] Pass to continue HEP. 5' 12/27 - added          Neuromuscular Re-ed (04805)         Cervical tucks  Cervical tuck & lifts  Wall W's  Standing scaption with ball behind head to isolate cervical extension Supine TB D2 flexion  Supine @ 90 ER/IR  Wall series  BB yellow B flexion          Manual Intervention (13284) - 10'       Consider prone C/S PA mobs Prone T/S mobs G3-4 5 mobs 3 passes T1-9    CT junction mobs/manip G3-4 5 mobs R/L C6-T2 targeted    Supine Cervical Sideglides G3-4 5 mobs ea 2 passes C2-7; R/L Cervical PROM:  sidebends  Rotation  flexion     2 x 5'' ea    Seated CT Manip               Modalities:     Pt. Education:  -patient educated on diagnosis, prognosis and expectations for rehab  -all patient questions were answered    Home Exercise Program:  Access Code: [email or web address]  Blue band provided  URL: [email or web address]  Date: 12/08/2021  Prepared by: Matt Rolling    Exercises  Seated Passive Cervical Retraction - 1-3 x daily - 5 x weekly - 1-3 sets - 5 reps - 2-3 hold  Seated Scapular Retraction - 1-3 x daily - 5 x weekly - 13 sets - 5 reps - 3 hold  Seated Upper Trapezius Stretch - 1-3 x daily - 5 x weekly - 1 sets - 2-3 reps - 10 hold  Doorway Pec Stretch at 90 Degrees Abduction - 1-3 x daily - 5 x weekly - 1 sets - 4 reps - 10 hold  Standing Row with Anchored Resistance - 1 x daily - 4 x weekly - 3 sets - 10 reps  Shoulder Extension with Resistance - 1 x daily - 4 x weekly - 3 sets - 10 reps  Shoulder External Rotation and Scapular Retraction with Resistance - 1 x daily - 4 x weekly - 3 sets - 15 reps  Seated Levator Scapulae Stretch - 1-3 x daily - 5 x weekly - 1 sets - 2-3 reps - 10 hold    Access Code: [email or web address]  URL: [email or web address]  Date: 11/26/2021  Prepared by: Pina Henry     Exercises  Seated Passive Cervical Retraction - 1-3 x daily - 5 x weekly - 1-3 sets - 5 reps - 2-3 hold  Seated Scapular Retraction - 1-3 x daily - 5 x weekly - 13 sets - 5 reps - 3 hold  Seated Upper Trapezius Stretch - 1-3 x daily - 5 x weekly - 1 sets - 2-3 reps - 10 hold  Doorway Pec Stretch at 90 Degrees Abduction - 1-3 x daily - 5 x weekly - 1 sets - 4 reps - 10 hold  Standing Row with Anchored Resistance - 1 x daily - 4 x weekly - 3 sets - 10 reps  Shoulder Extension with Resistance - 1 x daily - 4 x weekly - 3 sets - 10 reps    Therapeutic Exercise and NMR EXR  [x] (84786) Provided verbal/tactile cueing for activities related to strengthening, flexibility, endurance, ROM for improvements in  [] LE / Lumbar: LE, proximal hip, and core control with self care, mobility, lifting, ambulation. [x] UE / Cervical: cervical, postural, scapular, scapulothoracic and UE control with self care, reaching, carrying, lifting, house/yardwork, driving, computer work.  [] (24160) Provided verbal/tactile cueing for activities related to improving balance, coordination, kinesthetic sense, posture, motor skill, proprioception to assist with   [] LE / lumbar: LE, proximal hip, and core control in self care, mobility, lifting, ambulation and eccentric single leg control.    [] UE / cervical: cervical, scapular, scapulothoracic and UE control with self care, reaching, carrying, lifting, house/yardwork, driving, computer work.   [] (33705) Therapist is in constant attendance of 2 or more patients providing skilled therapy interventions, but not providing any significant amount of measurable one-on-one time to either patient, for improvements in  [] LE / lumbar: LE, proximal hip, and core control in self care, mobility, lifting, ambulation and eccentric single leg control. [] UE / cervical: cervical, scapular, scapulothoracic and UE control with self care, reaching, carrying, lifting, house/yardwork, driving, computer work. NMR and Therapeutic Activities:    [x] (50413 or 80402) Provided verbal/tactile cueing for activities related to improving balance, coordination, kinesthetic sense, posture, motor skill, proprioception and motor activation to allow for proper function of   [] LE: / Lumbar core, proximal hip and LE with self care and ADLs  [x] UE / Cervical: cervical, postural, scapular, scapulothoracic and UE control with self care, carrying, lifting, driving, computer work.   [] (92313) Gait Re-education- Provided training and instruction to the patient for proper LE, core and proximal hip recruitment and positioning and eccentric body weight control with ambulation re-education including up and down stairs     Home Management Training / Self Care:  [] (51523) Provided self-care/home management training related to activities of daily living and compensatory training, and/or use of adaptive equipment for improvement with: ADLs and compensatory training, meal preparation, safety procedures and instruction in use of adaptive equipment, including bathing, grooming, dressing, personal hygiene, basic household cleaning and chores.      Home Exercise Program:    [] (16780) Reviewed/Progressed HEP activities related to strengthening, flexibility, endurance, ROM of   [] LE / Lumbar: core, proximal hip and LE for functional self-care, mobility, lifting and ambulation/stair navigation   [] UE / Cervical: cervical, postural, scapular, scapulothoracic and UE control with